# Patient Record
Sex: FEMALE | Race: WHITE | NOT HISPANIC OR LATINO
[De-identification: names, ages, dates, MRNs, and addresses within clinical notes are randomized per-mention and may not be internally consistent; named-entity substitution may affect disease eponyms.]

---

## 2019-04-09 ENCOUNTER — RESULT REVIEW (OUTPATIENT)
Age: 34
End: 2019-04-09

## 2019-05-07 ENCOUNTER — APPOINTMENT (OUTPATIENT)
Dept: ANTEPARTUM | Facility: CLINIC | Age: 34
End: 2019-05-07
Payer: COMMERCIAL

## 2019-05-07 ENCOUNTER — ASOB RESULT (OUTPATIENT)
Age: 34
End: 2019-05-07

## 2019-05-07 PROCEDURE — 36415 COLL VENOUS BLD VENIPUNCTURE: CPT

## 2019-05-07 PROCEDURE — 99201 OFFICE OUTPATIENT NEW 10 MINUTES: CPT | Mod: 25

## 2019-05-07 PROCEDURE — 36416 COLLJ CAPILLARY BLOOD SPEC: CPT

## 2019-05-07 PROCEDURE — 76813 OB US NUCHAL MEAS 1 GEST: CPT

## 2019-05-07 PROCEDURE — 76801 OB US < 14 WKS SINGLE FETUS: CPT

## 2019-05-21 ENCOUNTER — TRANSCRIPTION ENCOUNTER (OUTPATIENT)
Age: 34
End: 2019-05-21

## 2019-07-05 ENCOUNTER — ASOB RESULT (OUTPATIENT)
Age: 34
End: 2019-07-05

## 2019-07-05 ENCOUNTER — APPOINTMENT (OUTPATIENT)
Dept: ANTEPARTUM | Facility: CLINIC | Age: 34
End: 2019-07-05
Payer: COMMERCIAL

## 2019-07-05 PROCEDURE — 76811 OB US DETAILED SNGL FETUS: CPT

## 2019-07-05 PROCEDURE — 99213 OFFICE O/P EST LOW 20 MIN: CPT | Mod: 25

## 2019-11-05 ENCOUNTER — TRANSCRIPTION ENCOUNTER (OUTPATIENT)
Age: 34
End: 2019-11-05

## 2019-11-05 ENCOUNTER — OUTPATIENT (OUTPATIENT)
Dept: OUTPATIENT SERVICES | Facility: HOSPITAL | Age: 34
LOS: 1 days | End: 2019-11-05

## 2019-11-05 ENCOUNTER — INPATIENT (INPATIENT)
Facility: HOSPITAL | Age: 34
LOS: 3 days | Discharge: HOME | End: 2019-11-09
Attending: OBSTETRICS & GYNECOLOGY | Admitting: OBSTETRICS & GYNECOLOGY

## 2019-11-05 VITALS
WEIGHT: 227.96 LBS | TEMPERATURE: 98 F | DIASTOLIC BLOOD PRESSURE: 74 MMHG | HEIGHT: 63 IN | OXYGEN SATURATION: 98 % | HEART RATE: 85 BPM | RESPIRATION RATE: 16 BRPM | SYSTOLIC BLOOD PRESSURE: 106 MMHG

## 2019-11-05 VITALS
HEART RATE: 93 BPM | TEMPERATURE: 98 F | RESPIRATION RATE: 16 BRPM | SYSTOLIC BLOOD PRESSURE: 123 MMHG | DIASTOLIC BLOOD PRESSURE: 70 MMHG

## 2019-11-05 DIAGNOSIS — Z98.890 OTHER SPECIFIED POSTPROCEDURAL STATES: Chronic | ICD-10-CM

## 2019-11-05 DIAGNOSIS — O42.90 PREMATURE RUPTURE OF MEMBRANES, UNSPECIFIED AS TO LENGTH OF TIME BETWEEN RUPTURE AND ONSET OF LABOR, UNSPECIFIED WEEKS OF GESTATION: ICD-10-CM

## 2019-11-05 DIAGNOSIS — K08.409 PARTIAL LOSS OF TEETH, UNSPECIFIED CAUSE, UNSPECIFIED CLASS: Chronic | ICD-10-CM

## 2019-11-05 DIAGNOSIS — Z87.2 PERSONAL HISTORY OF DISEASES OF THE SKIN AND SUBCUTANEOUS TISSUE: Chronic | ICD-10-CM

## 2019-11-05 DIAGNOSIS — T78.40XA ALLERGY, UNSPECIFIED, INITIAL ENCOUNTER: ICD-10-CM

## 2019-11-05 DIAGNOSIS — Z3A.00 WEEKS OF GESTATION OF PREGNANCY NOT SPECIFIED: ICD-10-CM

## 2019-11-05 DIAGNOSIS — Z01.818 ENCOUNTER FOR OTHER PREPROCEDURAL EXAMINATION: ICD-10-CM

## 2019-11-05 DIAGNOSIS — O26.899 OTHER SPECIFIED PREGNANCY RELATED CONDITIONS, UNSPECIFIED TRIMESTER: ICD-10-CM

## 2019-11-05 LAB
ANION GAP SERPL CALC-SCNC: 16 MMO/L — HIGH (ref 7–14)
APPEARANCE UR: CLEAR — SIGNIFICANT CHANGE UP
BILIRUB UR-MCNC: NEGATIVE — SIGNIFICANT CHANGE UP
BLOOD UR QL VISUAL: NEGATIVE — SIGNIFICANT CHANGE UP
BUN SERPL-MCNC: 12 MG/DL — SIGNIFICANT CHANGE UP (ref 7–23)
CALCIUM SERPL-MCNC: 9.5 MG/DL — SIGNIFICANT CHANGE UP (ref 8.4–10.5)
CHLORIDE SERPL-SCNC: 100 MMOL/L — SIGNIFICANT CHANGE UP (ref 98–107)
CO2 SERPL-SCNC: 19 MMOL/L — LOW (ref 22–31)
COLOR SPEC: SIGNIFICANT CHANGE UP
CREAT SERPL-MCNC: 0.6 MG/DL — SIGNIFICANT CHANGE UP (ref 0.5–1.3)
GLUCOSE SERPL-MCNC: 107 MG/DL — HIGH (ref 70–99)
GLUCOSE UR-MCNC: NEGATIVE — SIGNIFICANT CHANGE UP
HCT VFR BLD CALC: 36.4 % — SIGNIFICANT CHANGE UP (ref 34.5–45)
HGB BLD-MCNC: 11.7 G/DL — SIGNIFICANT CHANGE UP (ref 11.5–15.5)
KETONES UR-MCNC: NEGATIVE — SIGNIFICANT CHANGE UP
LEUKOCYTE ESTERASE UR-ACNC: NEGATIVE — SIGNIFICANT CHANGE UP
MCHC RBC-ENTMCNC: 28.3 PG — SIGNIFICANT CHANGE UP (ref 27–34)
MCHC RBC-ENTMCNC: 32.1 % — SIGNIFICANT CHANGE UP (ref 32–36)
MCV RBC AUTO: 88.1 FL — SIGNIFICANT CHANGE UP (ref 80–100)
NITRITE UR-MCNC: NEGATIVE — SIGNIFICANT CHANGE UP
NRBC # FLD: 0 K/UL — SIGNIFICANT CHANGE UP (ref 0–0)
PH UR: 6.5 — SIGNIFICANT CHANGE UP (ref 5–8)
PLATELET # BLD AUTO: 274 K/UL — SIGNIFICANT CHANGE UP (ref 150–400)
PMV BLD: 11.2 FL — SIGNIFICANT CHANGE UP (ref 7–13)
POTASSIUM SERPL-MCNC: 3.9 MMOL/L — SIGNIFICANT CHANGE UP (ref 3.5–5.3)
POTASSIUM SERPL-SCNC: 3.9 MMOL/L — SIGNIFICANT CHANGE UP (ref 3.5–5.3)
PROT UR-MCNC: NEGATIVE — SIGNIFICANT CHANGE UP
RBC # BLD: 4.13 M/UL — SIGNIFICANT CHANGE UP (ref 3.8–5.2)
RBC # FLD: 14.1 % — SIGNIFICANT CHANGE UP (ref 10.3–14.5)
SODIUM SERPL-SCNC: 135 MMOL/L — SIGNIFICANT CHANGE UP (ref 135–145)
SP GR SPEC: 1.01 — SIGNIFICANT CHANGE UP (ref 1–1.04)
UROBILINOGEN FLD QL: NORMAL — SIGNIFICANT CHANGE UP
WBC # BLD: 10.26 K/UL — SIGNIFICANT CHANGE UP (ref 3.8–10.5)
WBC # FLD AUTO: 10.26 K/UL — SIGNIFICANT CHANGE UP (ref 3.8–10.5)

## 2019-11-05 RX ORDER — OXYTOCIN 10 UNIT/ML
333.33 VIAL (ML) INJECTION
Qty: 20 | Refills: 0 | Status: DISCONTINUED | OUTPATIENT
Start: 2019-11-05 | End: 2019-11-06

## 2019-11-05 RX ORDER — METOCLOPRAMIDE HCL 10 MG
10 TABLET ORAL ONCE
Refills: 0 | Status: DISCONTINUED | OUTPATIENT
Start: 2019-11-05 | End: 2019-11-06

## 2019-11-05 RX ORDER — SODIUM CHLORIDE 9 MG/ML
1000 INJECTION, SOLUTION INTRAVENOUS ONCE
Refills: 0 | Status: DISCONTINUED | OUTPATIENT
Start: 2019-11-05 | End: 2019-11-06

## 2019-11-05 RX ORDER — FAMOTIDINE 10 MG/ML
20 INJECTION INTRAVENOUS ONCE
Refills: 0 | Status: DISCONTINUED | OUTPATIENT
Start: 2019-11-05 | End: 2019-11-06

## 2019-11-05 RX ORDER — SODIUM CHLORIDE 9 MG/ML
1000 INJECTION, SOLUTION INTRAVENOUS
Refills: 0 | Status: DISCONTINUED | OUTPATIENT
Start: 2019-11-05 | End: 2019-11-06

## 2019-11-05 RX ORDER — CITRIC ACID/SODIUM CITRATE 300-500 MG
30 SOLUTION, ORAL ORAL ONCE
Refills: 0 | Status: DISCONTINUED | OUTPATIENT
Start: 2019-11-05 | End: 2019-11-06

## 2019-11-05 NOTE — OB PROVIDER TRIAGE NOTE - HISTORY OF PRESENT ILLNESS
34y.o. G1 at 38.3weeks presenting with complaints of leakage of fluid today at 2040.  Patient reports experiencing a gush of fluid and has been leaking since, fluid is clear in nature.  Patient reports positive fetal movement, denies vaginal bleeding, denies contractions.    a/p course complicated by breech presentation  patient scheduled for primary c/s at 39w on 11/11/2019

## 2019-11-05 NOTE — OB RN TRIAGE NOTE - CHIEF COMPLAINT QUOTE
I broke my water @ 2040 , clear . I'm scheduled for C/S on 11/11 for breeckh presentation. I broke my water @ 2040 , clear . I'm scheduled for C/S on 11/11 for breech presentation.

## 2019-11-05 NOTE — OB PST NOTE - NSHPPHYSICALEXAM_GEN_ALL_CORE
Constitutional: Well Developed, Well Groomed, Well Nourished, No Distress    Eyes: PERRL, EOMI, conjunctiva clear    Ears: Normal    Mouth & Gums: Normal, moist    Pharynx: No tenderness, discharge, or peritonsillar abscess    Tonsils: No Redness, discharge, tenderness, or swelling    Neck: Supple, no JVD, normal thyroid glands, no carotid bruits, no cervical vertebral or paraspinal tenderness      Back: Normal shape, ROM intact, strength intact, no vertebral tenderness    Respiratory: Airway patent, breath sounds equal, good air movement, respiration non-labored, clear to auscultation bilateral, no chest wall tenderness, no intercostal retractions, no rales, no wheezes, no rhonchi, no subcutaneous emphysema    Cardiovascular:  Regular rate and rhythm, no rubs or murmur, normal PMI    Gastrointestinal: Gravid Uterus ; pt reports positive fetal mobility    Extremities: No clubbing, cyanosis, or pedal edema    Vascular:  Carotid Pulse normal , Radial Pulse normal, Femoral Pulse normal, DP pulse normal, PT pulse normal    Neurological: alert & oriented x 3, sensation intact, deep reflexes intact, cranial nerve intact, normal strength    Skin: warm and dry, normal color    Lymph Nodes: normal posterior cervical lymph node, normal anterior cervical lymph node, normal supraclavicular lymph node, normal axillary lymph node, normal inguinal lymph node, normal femoral lymph node    Musculoskeletal: ROM intact, no joint swelling, warmth, or calf tenderness. Normal strength    Psychiatric: normal affect, normal behavior

## 2019-11-05 NOTE — OB PROVIDER TRIAGE NOTE - NSHPPHYSICALEXAM_GEN_ALL_CORE
Vital Signs Last 24 Hrs  T(C): 36.6 (05 Nov 2019 22:15), Max: 36.8 (05 Nov 2019 21:50)  T(F): 97.88 (05 Nov 2019 22:15), Max: 98.2 (05 Nov 2019 21:50)  HR: 90 (05 Nov 2019 22:26) (85 - 93)  BP: 119/69 (05 Nov 2019 22:26) (106/74 - 123/70)  BP(mean): --  RR: 16 (05 Nov 2019 21:50) (16 - 16)  SpO2: 98% (05 Nov 2019 17:55) (98% - 98%)    abdomen soft nontender gravid  fhr 145  no uterine contractions noted on tocometer    SSE: no lesions   (+) pooling, (+) nitrazine, (+) ferning  VE: closed    TAVUS: breech; head to maternal right

## 2019-11-05 NOTE — OB PST NOTE - PMH
Hypoglycemia    Hypothyroidism  dx @ 25 y.o. F/U with endocrinologist DR Renate Vanessa ; last labs 2 weeks ago

## 2019-11-05 NOTE — OB PST NOTE - PROBLEM SELECTOR PLAN 1
Primary  Breech Presentation Primary  Section  Breech Presentation    Pre op instructions including Hibiclens with teach back reviewed with pt ; pt verbalized good understanding of pre op instructions

## 2019-11-05 NOTE — OB PROVIDER H&P - ASSESSMENT
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     Allergies   Avelox  Ceclor  Duricef    Medications  Synthroid 88mcg  PNV    Assessment  Vital Signs Last 24 Hrs  T(C): 36.6 (05 Nov 2019 22:15), Max: 36.8 (05 Nov 2019 21:50)  T(F): 97.88 (05 Nov 2019 22:15), Max: 98.2 (05 Nov 2019 21:50)  HR: 90 (05 Nov 2019 22:26) (85 - 93)  BP: 119/69 (05 Nov 2019 22:26) (106/74 - 123/70)  BP(mean): --  RR: 16 (05 Nov 2019 21:50) (16 - 16)  SpO2: 98% (05 Nov 2019 17:55) (98% - 98%)    abdomen soft nontender gravid  fhr 145  no uterine contractions noted on tocometer    SSE: no lesions   (+) pooling, (+) nitrazine, (+) ferning  VE: closed    TAVUS: breech; head to maternal right    Last ate pizza at 2030    Admit to Labor and Delivery for primary c/s ROM not in labor   Routine admission labs   Pre-Operative medications to be administered   [facility] EFM and [place]     D/w Dr. Hull & Dr. 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     Allergies   Avelox:   Ceclor: anaphylaxis   Duricef: anaphylaxis      Medications  Synthroid 88mcg  PNV    Assessment  Vital Signs Last 24 Hrs  T(C): 36.6 (05 Nov 2019 22:15), Max: 36.8 (05 Nov 2019 21:50)  T(F): 97.88 (05 Nov 2019 22:15), Max: 98.2 (05 Nov 2019 21:50)  HR: 90 (05 Nov 2019 22:26) (85 - 93)  BP: 119/69 (05 Nov 2019 22:26) (106/74 - 123/70)  BP(mean): --  RR: 16 (05 Nov 2019 21:50) (16 - 16)  SpO2: 98% (05 Nov 2019 17:55) (98% - 98%)    abdomen soft nontender gravid  fhr 145  no uterine contractions noted on tocometer    SSE: no lesions   (+) pooling, (+) nitrazine, (+) ferning  VE: closed    TAVUS: breech; head to maternal right    Last ate pizza at 2030    Admit to Labor and Delivery for primary c/s ROM not in labor   Routine admission labs   Pre-Operative medications to be administered   Contin[facility] EFM and [place]     D/w Dr. Hull & Dr. Juarez

## 2019-11-05 NOTE — OB PST NOTE - HISTORY OF PRESENT ILLNESS
Pt is a 34 y.o. female Information obtained from pt and  Worksheet provided by surgeon office . Pt reports LMP 19 38 week 3 day pregnancy EDC 19. Pt reports 19 ; a sonogram was done " the baby is breech " Pt states last sonogram 10/30/19. pt now presents for Primary  Section ; Breech Presentation    Pt reports 48 lb weight gain with pregnancy   Pt reports positive fetal mobility

## 2019-11-05 NOTE — OB PROVIDER H&P - HISTORY OF PRESENT ILLNESS
34y.o. G1 at 38.3weeks presenting with complaints of leakage of fluid today at 2040.  Patient reports experiencing a gush of fluid and has been leaking since, fluid is clear in nature.  Patient reports positive fetal movement, denies vaginal bleeding, denies contractions.    a/p course complicated by breech presentation  patient scheduled for primary c/s at 39w on 11/11/2019  GBS(-)

## 2019-11-05 NOTE — OB PST NOTE - NSHPREVIEWOFSYSTEMS_GEN_ALL_CORE
General: No fever, chills, sweating, anorexia, +48 lb weight gain, or dryness.     Breast: No tenderness, lumps, or nipple discharge      Ophthalmologic:+ dry eyes;  No diplopia, photophobia, lacrimation, blurred Vision , or eye discharge    ENMT Symptoms: No hearing difficulty, ear pain, tinnitus, or vertigo. No sinus symptoms, nasal congestion, nasal   discharge, or nasal obstruction    Respiratory and Thorax: No wheezing, dyspnea, cough, hemoptysis, or pleuritic chest pPain     Cardiovascular: No chest pain, palpitations, dyspnea on exertion, orthopnea, paroxysmal nocturnal dyspnea,   peripheral edema, or claudication    Gastrointestinal: No nausea, vomiting, diarrhea, constipation,    Genitourinary/ Pelvis: No hematuria, renal colic, or flank pain.  No urine discoloration, incontinence, dysuria, or urinary hesitancy. Normal urinary frequency. No nocturia, abnormal vaginal bleeding, vaginal discharge, spotting, pelvic pain, or vaginal leakage    Musculoskeletal: + lower back pain throughout pregnancy pt  denies recent studies  No arthralgia, arthritis, joint swelling, muscle cramping, muscle weakness, neck pain, arm pain, or leg pain    Neurological: No transient paralysis, weakness, paresthesias, or seizures. No syncope, tremors, vertigo, loss of sensation, difficulty walking, loss of consciousness, hemiparesis, confusion, or facial palsy    Psychiatric: No suicidal ideation, depression, anxiety, insomnia     Hematology: No gum bleeding, nose bleeding, or skin lumps    Lymphatic: No enlarged or tender lymph nodes. No extremity swelling    Endocrine: No heat or cold intolerance; pt reports h/o hypoglycemia     Immunologic: No recurrent or persistent infections General: No fever, chills, sweating, anorexia, +48 lb weight gain, or dryness.     Breast: No tenderness, lumps, or nipple discharge      Ophthalmologic:+ dry eyes;  No diplopia, photophobia, lacrimation, blurred Vision , or eye discharge    ENMT Symptoms: No hearing difficulty, ear pain, tinnitus, or vertigo. No sinus symptoms, nasal congestion, nasal   discharge, or nasal obstruction    Respiratory and Thorax: No wheezing, dyspnea, cough, hemoptysis, or pleuritic chest pain     Cardiovascular: No chest pain, palpitations, dyspnea on exertion, orthopnea, paroxysmal nocturnal dyspnea,   peripheral edema, or claudication    Gastrointestinal: No nausea, vomiting, diarrhea, constipation,    Genitourinary/ Pelvis: No hematuria, renal colic, or flank pain.  No urine discoloration, incontinence, dysuria, or urinary hesitancy. Normal urinary frequency. No nocturia, abnormal vaginal bleeding, vaginal discharge, spotting, pelvic pain, or vaginal leakage    Musculoskeletal: + lower back pain throughout pregnancy pt  denies recent studies  No arthralgia, arthritis, joint swelling, muscle cramping, muscle weakness, neck pain, arm pain, or leg pain    Neurological: No transient paralysis, weakness, paresthesias, or seizures. No syncope, tremors, vertigo, loss of sensation, difficulty walking, loss of consciousness, hemiparesis, confusion, or facial palsy    Psychiatric: No suicidal ideation, depression, anxiety, insomnia     Hematology: No gum bleeding, nose bleeding, or skin lumps    Lymphatic: No enlarged or tender lymph nodes. No extremity swelling    Endocrine: No heat or cold intolerance; pt reports h/o hypoglycemia     Immunologic: No recurrent or persistent infections

## 2019-11-06 ENCOUNTER — TRANSCRIPTION ENCOUNTER (OUTPATIENT)
Age: 34
End: 2019-11-06

## 2019-11-06 LAB
BASOPHILS # BLD AUTO: 0.03 K/UL — SIGNIFICANT CHANGE UP (ref 0–0.2)
BASOPHILS NFR BLD AUTO: 0.3 % — SIGNIFICANT CHANGE UP (ref 0–2)
BLD GP AB SCN SERPL QL: NEGATIVE — SIGNIFICANT CHANGE UP
EOSINOPHIL # BLD AUTO: 0.07 K/UL — SIGNIFICANT CHANGE UP (ref 0–0.5)
EOSINOPHIL NFR BLD AUTO: 0.6 % — SIGNIFICANT CHANGE UP (ref 0–6)
HCT VFR BLD CALC: 34.6 % — SIGNIFICANT CHANGE UP (ref 34.5–45)
HCT VFR BLD CALC: 37.5 % — SIGNIFICANT CHANGE UP (ref 34.5–45)
HGB BLD-MCNC: 11.2 G/DL — LOW (ref 11.5–15.5)
HGB BLD-MCNC: 11.7 G/DL — SIGNIFICANT CHANGE UP (ref 11.5–15.5)
IMM GRANULOCYTES NFR BLD AUTO: 0.6 % — SIGNIFICANT CHANGE UP (ref 0–1.5)
LYMPHOCYTES # BLD AUTO: 2.28 K/UL — SIGNIFICANT CHANGE UP (ref 1–3.3)
LYMPHOCYTES # BLD AUTO: 20.2 % — SIGNIFICANT CHANGE UP (ref 13–44)
MCHC RBC-ENTMCNC: 28.3 PG — SIGNIFICANT CHANGE UP (ref 27–34)
MCHC RBC-ENTMCNC: 28.7 PG — SIGNIFICANT CHANGE UP (ref 27–34)
MCHC RBC-ENTMCNC: 31.2 % — LOW (ref 32–36)
MCHC RBC-ENTMCNC: 32.4 % — SIGNIFICANT CHANGE UP (ref 32–36)
MCV RBC AUTO: 88.7 FL — SIGNIFICANT CHANGE UP (ref 80–100)
MCV RBC AUTO: 90.6 FL — SIGNIFICANT CHANGE UP (ref 80–100)
MONOCYTES # BLD AUTO: 0.73 K/UL — SIGNIFICANT CHANGE UP (ref 0–0.9)
MONOCYTES NFR BLD AUTO: 6.5 % — SIGNIFICANT CHANGE UP (ref 2–14)
NEUTROPHILS # BLD AUTO: 8.1 K/UL — HIGH (ref 1.8–7.4)
NEUTROPHILS NFR BLD AUTO: 71.8 % — SIGNIFICANT CHANGE UP (ref 43–77)
NRBC # FLD: 0 K/UL — SIGNIFICANT CHANGE UP (ref 0–0)
NRBC # FLD: 0 K/UL — SIGNIFICANT CHANGE UP (ref 0–0)
PLATELET # BLD AUTO: 219 K/UL — SIGNIFICANT CHANGE UP (ref 150–400)
PLATELET # BLD AUTO: 250 K/UL — SIGNIFICANT CHANGE UP (ref 150–400)
PMV BLD: 10.9 FL — SIGNIFICANT CHANGE UP (ref 7–13)
PMV BLD: 11.4 FL — SIGNIFICANT CHANGE UP (ref 7–13)
RBC # BLD: 3.9 M/UL — SIGNIFICANT CHANGE UP (ref 3.8–5.2)
RBC # BLD: 4.14 M/UL — SIGNIFICANT CHANGE UP (ref 3.8–5.2)
RBC # FLD: 14.1 % — SIGNIFICANT CHANGE UP (ref 10.3–14.5)
RBC # FLD: 14.1 % — SIGNIFICANT CHANGE UP (ref 10.3–14.5)
RH IG SCN BLD-IMP: POSITIVE — SIGNIFICANT CHANGE UP
T PALLIDUM AB TITR SER: NEGATIVE — SIGNIFICANT CHANGE UP
T PALLIDUM AB TITR SER: NEGATIVE — SIGNIFICANT CHANGE UP
WBC # BLD: 11.08 K/UL — HIGH (ref 3.8–10.5)
WBC # BLD: 11.28 K/UL — HIGH (ref 3.8–10.5)
WBC # FLD AUTO: 11.08 K/UL — HIGH (ref 3.8–10.5)
WBC # FLD AUTO: 11.28 K/UL — HIGH (ref 3.8–10.5)

## 2019-11-06 RX ORDER — SODIUM CHLORIDE 9 MG/ML
500 INJECTION, SOLUTION INTRAVENOUS ONCE
Refills: 0 | Status: COMPLETED | OUTPATIENT
Start: 2019-11-06 | End: 2019-11-06

## 2019-11-06 RX ORDER — TETANUS TOXOID, REDUCED DIPHTHERIA TOXOID AND ACELLULAR PERTUSSIS VACCINE, ADSORBED 5; 2.5; 8; 8; 2.5 [IU]/.5ML; [IU]/.5ML; UG/.5ML; UG/.5ML; UG/.5ML
0.5 SUSPENSION INTRAMUSCULAR ONCE
Refills: 0 | Status: DISCONTINUED | OUTPATIENT
Start: 2019-11-06 | End: 2019-11-09

## 2019-11-06 RX ORDER — ACETAMINOPHEN 500 MG
975 TABLET ORAL
Refills: 0 | Status: DISCONTINUED | OUTPATIENT
Start: 2019-11-06 | End: 2019-11-09

## 2019-11-06 RX ORDER — OXYCODONE HYDROCHLORIDE 5 MG/1
10 TABLET ORAL
Refills: 0 | Status: DISCONTINUED | OUTPATIENT
Start: 2019-11-06 | End: 2019-11-07

## 2019-11-06 RX ORDER — SODIUM CHLORIDE 9 MG/ML
1000 INJECTION, SOLUTION INTRAVENOUS
Refills: 0 | Status: DISCONTINUED | OUTPATIENT
Start: 2019-11-06 | End: 2019-11-06

## 2019-11-06 RX ORDER — HYDROMORPHONE HYDROCHLORIDE 2 MG/ML
1 INJECTION INTRAMUSCULAR; INTRAVENOUS; SUBCUTANEOUS
Refills: 0 | Status: DISCONTINUED | OUTPATIENT
Start: 2019-11-06 | End: 2019-11-07

## 2019-11-06 RX ORDER — SODIUM CHLORIDE 9 MG/ML
1000 INJECTION, SOLUTION INTRAVENOUS ONCE
Refills: 0 | Status: COMPLETED | OUTPATIENT
Start: 2019-11-06 | End: 2019-11-06

## 2019-11-06 RX ORDER — IBUPROFEN 200 MG
600 TABLET ORAL EVERY 6 HOURS
Refills: 0 | Status: COMPLETED | OUTPATIENT
Start: 2019-11-06 | End: 2020-10-04

## 2019-11-06 RX ORDER — CITRIC ACID/SODIUM CITRATE 300-500 MG
30 SOLUTION, ORAL ORAL ONCE
Refills: 0 | Status: COMPLETED | OUTPATIENT
Start: 2019-11-06 | End: 2019-11-06

## 2019-11-06 RX ORDER — OXYCODONE HYDROCHLORIDE 5 MG/1
5 TABLET ORAL
Refills: 0 | Status: DISCONTINUED | OUTPATIENT
Start: 2019-11-06 | End: 2019-11-07

## 2019-11-06 RX ORDER — NALOXONE HYDROCHLORIDE 4 MG/.1ML
0.1 SPRAY NASAL
Refills: 0 | Status: DISCONTINUED | OUTPATIENT
Start: 2019-11-06 | End: 2019-11-07

## 2019-11-06 RX ORDER — LANOLIN
1 OINTMENT (GRAM) TOPICAL EVERY 6 HOURS
Refills: 0 | Status: DISCONTINUED | OUTPATIENT
Start: 2019-11-06 | End: 2019-11-09

## 2019-11-06 RX ORDER — MORPHINE SULFATE 50 MG/1
0.15 CAPSULE, EXTENDED RELEASE ORAL ONCE
Refills: 0 | Status: DISCONTINUED | OUTPATIENT
Start: 2019-11-06 | End: 2019-11-06

## 2019-11-06 RX ORDER — FERROUS SULFATE 325(65) MG
325 TABLET ORAL DAILY
Refills: 0 | Status: DISCONTINUED | OUTPATIENT
Start: 2019-11-06 | End: 2019-11-09

## 2019-11-06 RX ORDER — KETOROLAC TROMETHAMINE 30 MG/ML
30 SYRINGE (ML) INJECTION EVERY 6 HOURS
Refills: 0 | Status: DISCONTINUED | OUTPATIENT
Start: 2019-11-06 | End: 2019-11-07

## 2019-11-06 RX ORDER — IBUPROFEN 200 MG
1 TABLET ORAL
Qty: 0 | Refills: 0 | DISCHARGE
Start: 2019-11-06

## 2019-11-06 RX ORDER — OXYTOCIN 10 UNIT/ML
333.33 VIAL (ML) INJECTION
Qty: 20 | Refills: 0 | Status: DISCONTINUED | OUTPATIENT
Start: 2019-11-06 | End: 2019-11-06

## 2019-11-06 RX ORDER — MAGNESIUM HYDROXIDE 400 MG/1
30 TABLET, CHEWABLE ORAL
Refills: 0 | Status: DISCONTINUED | OUTPATIENT
Start: 2019-11-06 | End: 2019-11-09

## 2019-11-06 RX ORDER — HEPARIN SODIUM 5000 [USP'U]/ML
5000 INJECTION INTRAVENOUS; SUBCUTANEOUS EVERY 12 HOURS
Refills: 0 | Status: DISCONTINUED | OUTPATIENT
Start: 2019-11-06 | End: 2019-11-09

## 2019-11-06 RX ORDER — ONDANSETRON 8 MG/1
4 TABLET, FILM COATED ORAL EVERY 6 HOURS
Refills: 0 | Status: DISCONTINUED | OUTPATIENT
Start: 2019-11-06 | End: 2019-11-07

## 2019-11-06 RX ORDER — GENTAMICIN SULFATE 40 MG/ML
373 VIAL (ML) INJECTION ONCE
Refills: 0 | Status: DISCONTINUED | OUTPATIENT
Start: 2019-11-06 | End: 2019-11-06

## 2019-11-06 RX ORDER — GLYCERIN ADULT
1 SUPPOSITORY, RECTAL RECTAL AT BEDTIME
Refills: 0 | Status: DISCONTINUED | OUTPATIENT
Start: 2019-11-06 | End: 2019-11-09

## 2019-11-06 RX ORDER — LEVOTHYROXINE SODIUM 125 MCG
88 TABLET ORAL DAILY
Refills: 0 | Status: DISCONTINUED | OUTPATIENT
Start: 2019-11-06 | End: 2019-11-09

## 2019-11-06 RX ORDER — OXYCODONE HYDROCHLORIDE 5 MG/1
5 TABLET ORAL
Refills: 0 | Status: DISCONTINUED | OUTPATIENT
Start: 2019-11-06 | End: 2019-11-09

## 2019-11-06 RX ORDER — SIMETHICONE 80 MG/1
80 TABLET, CHEWABLE ORAL EVERY 4 HOURS
Refills: 0 | Status: DISCONTINUED | OUTPATIENT
Start: 2019-11-06 | End: 2019-11-09

## 2019-11-06 RX ORDER — SENNA PLUS 8.6 MG/1
2 TABLET ORAL AT BEDTIME
Refills: 0 | Status: DISCONTINUED | OUTPATIENT
Start: 2019-11-06 | End: 2019-11-09

## 2019-11-06 RX ORDER — ACETAMINOPHEN 500 MG
3 TABLET ORAL
Qty: 0 | Refills: 0 | DISCHARGE
Start: 2019-11-06

## 2019-11-06 RX ORDER — FAMOTIDINE 10 MG/ML
20 INJECTION INTRAVENOUS ONCE
Refills: 0 | Status: COMPLETED | OUTPATIENT
Start: 2019-11-06 | End: 2019-11-06

## 2019-11-06 RX ORDER — OXYCODONE HYDROCHLORIDE 5 MG/1
5 TABLET ORAL ONCE
Refills: 0 | Status: DISCONTINUED | OUTPATIENT
Start: 2019-11-06 | End: 2019-11-09

## 2019-11-06 RX ORDER — METOCLOPRAMIDE HCL 10 MG
10 TABLET ORAL ONCE
Refills: 0 | Status: COMPLETED | OUTPATIENT
Start: 2019-11-06 | End: 2019-11-06

## 2019-11-06 RX ORDER — DIPHENHYDRAMINE HCL 50 MG
25 CAPSULE ORAL EVERY 6 HOURS
Refills: 0 | Status: DISCONTINUED | OUTPATIENT
Start: 2019-11-06 | End: 2019-11-09

## 2019-11-06 RX ADMIN — Medication 975 MILLIGRAM(S): at 21:49

## 2019-11-06 RX ADMIN — SODIUM CHLORIDE 125 MILLILITER(S): 9 INJECTION, SOLUTION INTRAVENOUS at 01:16

## 2019-11-06 RX ADMIN — Medication 30 MILLIGRAM(S): at 12:39

## 2019-11-06 RX ADMIN — Medication 10 MILLIGRAM(S): at 04:32

## 2019-11-06 RX ADMIN — Medication 500 MILLIGRAM(S): at 05:00

## 2019-11-06 RX ADMIN — Medication 30 MILLILITER(S): at 04:32

## 2019-11-06 RX ADMIN — Medication 1000 MILLIUNIT(S)/MIN: at 06:48

## 2019-11-06 RX ADMIN — SODIUM CHLORIDE 1000 MILLILITER(S): 9 INJECTION, SOLUTION INTRAVENOUS at 11:15

## 2019-11-06 RX ADMIN — ONDANSETRON 4 MILLIGRAM(S): 8 TABLET, FILM COATED ORAL at 07:35

## 2019-11-06 RX ADMIN — SODIUM CHLORIDE 2000 MILLILITER(S): 9 INJECTION, SOLUTION INTRAVENOUS at 04:32

## 2019-11-06 RX ADMIN — Medication 975 MILLIGRAM(S): at 22:20

## 2019-11-06 RX ADMIN — SODIUM CHLORIDE 1000 MILLILITER(S): 9 INJECTION, SOLUTION INTRAVENOUS at 08:15

## 2019-11-06 RX ADMIN — FAMOTIDINE 20 MILLIGRAM(S): 10 INJECTION INTRAVENOUS at 04:32

## 2019-11-06 RX ADMIN — SODIUM CHLORIDE 2000 MILLILITER(S): 9 INJECTION, SOLUTION INTRAVENOUS at 06:35

## 2019-11-06 RX ADMIN — SODIUM CHLORIDE 75 MILLILITER(S): 9 INJECTION, SOLUTION INTRAVENOUS at 06:30

## 2019-11-06 RX ADMIN — HEPARIN SODIUM 5000 UNIT(S): 5000 INJECTION INTRAVENOUS; SUBCUTANEOUS at 12:38

## 2019-11-06 NOTE — BRIEF OPERATIVE NOTE - NSICDXBRIEFPREOP_GEN_ALL_CORE_FT
PRE-OP DIAGNOSIS:  Rupture of membranes with delay of delivery 06-Nov-2019 06:42:57 Rupture of membranes with delay of delivery Marsha Martínez  Breech presentation 06-Nov-2019 06:43:16  Marsha Martínez

## 2019-11-06 NOTE — LACTATION INITIAL EVALUATION - LACTATION INTERVENTIONS
assisted to put baby to rt. breast in football hold position with deep latch and baby noted to suck with stimulation;  pt. taught to hand express colostrum and feed baby from spoon/initiate skin to skin

## 2019-11-06 NOTE — PROVIDER CONTACT NOTE (OTHER) - ASSESSMENT
Laying HR 78   Laying BP  88/46  Sitting HR 72   Sitting /55   Standing HR 88  Standing /61. Pt asymptomatic. Pt states "I need to pee." Pt voided 800 @ 2105
Temp 36.4, HR 82, 101/54, 20 resp, 97% RA, Bleeding WNL, lungs clear, Pt denies pain.

## 2019-11-06 NOTE — OB RN PATIENT PROFILE - NS_PRENATALLOC_OBGYN_ALL_OB
Unsure of the etiology   Last WBC done [facility] on 10/24/17 was 6.2   Will f/up blood culture X 2 : Pend   RVP stat   No Respiratory complaints   WIll get U/A and UCX   F/up RVP
MD Office

## 2019-11-06 NOTE — OB PROVIDER DELIVERY SUMMARY - NSPROVIDERDELIVERYNOTE_OBGYN_ALL_OB_FT
Liveborn male infant from breech position   Apgars 9,9  Grossly normal uterus, tubes, ovaries bl  Uterus closed in one layer with vicryl  Peritoneum closed with vicryl  Subq with plan gut in running fashion  skin subcuticular with monocryl  good hemostasis and count correctx3

## 2019-11-06 NOTE — OB POSTPARTUM EVENT NOTE - NS_EVENTPTSUMMARY1_OBGYN_ALL_OB_FT
At 0630, patient vital signs are as follows:  BP 74/56  HR 87  RR 22  02 sat 97%    Repeat vitals at 0631 include:  BP 95/55  HR 87  RR 24  02 sat 96%

## 2019-11-06 NOTE — OB PROVIDER DELIVERY SUMMARY - NSPPHNORISK_OBGYN_ALL_OB
After evaluating the patient it has been determined they are at risk for postpartum hemorrhage. In my judgment no risk for PPH has been identified at this time.

## 2019-11-06 NOTE — OB POSTPARTUM EVENT NOTE - NS_EVENTSUMMARY1_OBGYN_ALL_OB_FT
status post primary  for SROM  at 204 clear breech presentation. Patient delivered at 0512 in OR 3 uncomplicated. , fluids in 2 liters and serrato out 100 cc. Patient at 0630 shows hypotension and complains of dizziness upon assessment by RN. Patient alert and oriented x4. Patient laid to 30 degree angle and encouraged to take slow deep breaths. MD Juarez made aware of patient's change in status.

## 2019-11-06 NOTE — OB NEONATOLOGY/PEDIATRICIAN DELIVERY SUMMARY - NSPEDSNEONOTESA_OBGYN_ALL_OB_FT
Baby is a 38.4 week GA M born to a 33 y/o  mother via primary C/S for breech presentation. Maternal history: hypoglycemia in teens, hypothyroid on synthroid. Pregnancy uncomplicated. Maternal blood type B+. Prenatal labs HIV neg, HBsAg neg, Rubella immune, RPR nonreactive. GBS - on 10/23. ROM 8hrs with clear fluid. Baby born vigorous and crying spontaneously. Warmed, dried, stimulated. Apgars 9 / 9. EOS 0.05. Breastfeeding, wants HepB, declines circ. PMD: Hero Weinberg

## 2019-11-06 NOTE — DISCHARGE NOTE OB - CARE PLAN
Goal:	delivery  Assessment and plan of treatment:	f/u 2wk Principal Discharge DX:	 delivery delivered  Goal:	delivery  Assessment and plan of treatment:	f/u 2wk

## 2019-11-06 NOTE — OB RN PATIENT PROFILE - AS SC BRADEN MOBILITY
Date: 10/24/2019    Patient Name: Renata Burt          To Whom it may concern: This letter has been written at the patient's request. The above patient was seen at the [facility] for treatment of a medical condition.     This patient sh (4) no limitation

## 2019-11-06 NOTE — DISCHARGE NOTE OB - MEDICATION SUMMARY - MEDICATIONS TO TAKE
I will START or STAY ON the medications listed below when I get home from the hospital:    acetaminophen 325 mg oral tablet  -- 3 tab(s) by mouth   -- Indication: For pain    ibuprofen 600 mg oral tablet  -- 1 tab(s) by mouth every 6 hours  -- Indication: For pain    Prenatal 1 oral capsule  -- 1 cap(s) by mouth once a day LD 11/5/19  -- Indication: For supplement    levothyroxine 88 mcg (0.088 mg) oral tablet  -- 1 tab(s) by mouth once a day  -- Indication: For thyroid supplement

## 2019-11-06 NOTE — OB POSTPARTUM EVENT NOTE - NS_EVENTFINDINGS1_OBGYN_ALL_OB_FT
MD Juarez made aware. Patient to receive a 1 liter bolus of lactated ringers over 30 minutes per MD Juarez. Will continue to monitor patient status, vital signs, and urine output per MD Juarez. patient informed of plan of care and verbalizes understanding.

## 2019-11-06 NOTE — PROVIDER CONTACT NOTE (OTHER) - SITUATION
Cesareansection patient orthostatics positive. Laying HR 78   Laying BP  88/46  Sitting HR 72   Sitting /55   Standing HR 88  Standing /61. Pt asymptomatic.

## 2019-11-06 NOTE — OB RN INTRAOPERATIVE NOTE - NS_SPECIMENS_OBGYN_ALL_OB
Anticipated Depth And Size Of Soft Tissue Excision (Required): Subcutaneous, Less than 1.5 cm Anticipated Excised Diameter (Required): 1.1 - 2.0 cm Location (Required): Extremities First Procedure You Would Like A Quote For?: Benign Excision Anticipated Depth And Size Of Soft Tissue Excision (Required): Subcutaneous, Less than 2 cm Detail Level: Detailed Repair: Intermediate Primary Closure Anticipated Stages (Required): 1 Anticipated Depth And Size Of Soft Tissue Excision (Required): Subcutaneous, Less than 3 cm Anticipated Wound Length (Required): 2.5 cm or less No

## 2019-11-06 NOTE — OB RN PATIENT PROFILE - ALERT: PERTINENT HISTORY
BioPhysical Profile(s)/Fetal Non-Stress Test (NST) 1st Trimester Sonogram/20 Week Level II Sonogram/BioPhysical Profile(s)/Fetal Non-Stress Test (NST)/Ultra Screen at 12 Weeks

## 2019-11-06 NOTE — LACTATION INITIAL EVALUATION - INTERVENTION OUTCOME
discussed benefits of breastfeeding, supply and demand, feeding on demand, feeding cues, manual expression of colostrum, wet and soiled diaper log and safe skin to skin and safe sleep practices;  encouraged to ask for assistance as needed/verbalizes understanding

## 2019-11-06 NOTE — PROVIDER CONTACT NOTE (OTHER) - RECOMMENDATIONS
Alice Lynn made aware. She recommends repeating orthostatics @ 2200
500cc Bolus and encourage PO fluids.

## 2019-11-06 NOTE — CHART NOTE - NSCHARTNOTEFT_GEN_A_CORE
PA Note    called by PACU RN 2/2 oliguria. Patient POD#0 s/p primary C/S . Patient received 2L IVF in OR. Upon arrival to PACU patient was hypotensive to the 70's & received 1L fluid bolus with improvement in vitals & urine output. Patient then received and additional 500cc bolus with UO of 40, 40, 45 following. The most recent hour however was 20ccs. Patient feels well, minimal bleeding per RN.     VS  T(C): 36.4 (11-06-19 @ 08:00)  HR: 81 (11-06-19 @ 11:00)  BP: 93/52 (11-06-19 @ 11:00)  RR: 19 (11-06-19 @ 11:00)  SpO2: 99% (11-06-19 @ 11:00)    LABS:                        11.7   11.28 )-----------( 250      ( 06 Nov 2019 00:45 )             37.5     05 Nov 2019 18:15    135    |  100    |  12     ----------------------------<  107    3.9     |  19     |  0.60     Ca    9.5        05 Nov 2019 18:15      Plan  - discussed above with Dr Escobar  - will send stat CBC   - 500cc additional fluid bolus ordered  - cont to closely monitor UO & vital signs    dw Dr Shawn aguilera PA Note    called by PACU RN 2/2 oliguria. Patient POD#0 s/p primary C/S . Patient received 2L IVF in OR. Upon arrival to PACU patient was hypotensive to the 70's & received 1L fluid bolus with improvement in vitals & urine output. Patient then received and additional 500cc bolus with UO of 40, 40, 45 following. The most recent hour however was 20ccs. Patient feels well, minimal bleeding per RN.     VS  T(C): 36.4 (11-06-19 @ 08:00)  HR: 81 (11-06-19 @ 11:00)  BP: 93/52 (11-06-19 @ 11:00)  RR: 19 (11-06-19 @ 11:00)  SpO2: 99% (11-06-19 @ 11:00)    LABS:                        11.7   11.28 )-----------( 250      ( 06 Nov 2019 00:45 )             37.5     05 Nov 2019 18:15    135    |  100    |  12     ----------------------------<  107    3.9     |  19     |  0.60     Ca    9.5        05 Nov 2019 18:15      Plan  - discussed above with Dr Escobar  - will send stat CBC   - 500cc additional fluid bolus ordered  - cont to closely monitor UO & vital signs    dw Dr Shawn kuhn pa      OB Attending  patient seen and agree with above.  Patient sitting up in PACU, no dizziness, CP, palpitations, SOB. Patient NPO for 8 hr before surgery.   VS 97/53 p 81  I -  2000 post op  O - 300 cc EBL per triton  urine 45, 45, 20    Abd soft NT/ND incision c/d/i  lochia min  Ext - c/c/e nt    IMP  POD#0 SROM Breech CS  low BP, asymptomatic  oliguria  PLAN  CBC sent  fluid bolus  ASIA Escobar

## 2019-11-06 NOTE — PROVIDER CONTACT NOTE (OTHER) - BACKGROUND
S/p c/s @ 38.4 wks for SROM/Breech, , 2L IVF, Output 100mL. In PACU Hypotensive, 1L bolus given with night RN and output 275mL first hour. 2nd hour output 40cc. Adequate is 52cc/hr for pt.
cesareansection patient from 11/6/19 @ 0512 s/p 1L bolus due to hypotension in L&D, and two 500cc boluses due to low urine output in L&D.

## 2019-11-06 NOTE — DISCHARGE NOTE OB - CARE PROVIDER_API CALL
Lila Juarez)  Obstetrics and Gynecology  [address], [address]  [place] [address]  Phone: [phone]  Fax: [phone]  Follow Up Time: 2 weeks

## 2019-11-06 NOTE — DISCHARGE NOTE OB - MATERIALS PROVIDED
Vaccinations/[facility]  Screening Program/  Immunization Record/Guide to Postpartum Care/[facility] Hearing Screen Program/Shaken Baby Prevention Handout/Birth Certificate Instructions

## 2019-11-06 NOTE — OB RN PATIENT PROFILE - NS_PLACENTAPOSESS_OBGYN_ALL_OB
Reason for visit: Breastfeeding questions; sleepy infant.     Breastfeeding experience/Education: Mom breast fed/pumped for 1 year with previous first children.      Mother's Breastfeeding Goal:  Mother would like to exclusively breast feed as long as possible.  Mother would like to avoid nipple shield use if possible.    Health History (pertinenet to milk production):  No risk factors present.     Breast Assessment: Mom has very large breasts and flatter nipples but they do juanis.     Feeding Assessment:  Observed infant go to breast in football hold.  Mom did well \"sandwiching\" her breast tissue and getting infant to latch on .  Mom has copious amount of colostrum she is very easily able to express.  Infant latches, take a few good sucks, then loses latch with cheek dimpling present.  Discussed suck training in between nursing sessions to help infant keep tongue down and anchoring the finger.  Was able to see some suckles bursts with swallows which were pointed out to mom.  Observed fair breast feeding for about 20 minutes.  Infant with sleepy periods in between suckling.      Feeding Plan of Care:  Observe for feeding cues.  Offer breast at least every 3 hours, but can hand express colostrum into infant's mouth if not latching at that time.  Suck training with finger between nursing sessions to help keep infant's tongue in proper position.     Education:  Educated mom on normal feeding pattern within first 24 hours of birth, suck training, infant stomach size and colostrum production and feeding cues.  New Beginnings booklet given and explained.  Lactation services and number given to mom for support if needed.  Encouraged mom to call with any needs or concerns.  Sherin Purcell RN, BSN, IBCLC      Spent 20 minutes in room.   No

## 2019-11-06 NOTE — BRIEF OPERATIVE NOTE - NSICDXBRIEFPOSTOP_GEN_ALL_CORE_FT
POST-OP DIAGNOSIS:  Breech presentation 06-Nov-2019 06:43:18  Marsha Martínez  Rupture of membranes with delay of delivery 06-Nov-2019 06:43:00 Rupture of membranes with delay of delivery Marsha Martínez

## 2019-11-07 LAB
BASOPHILS # BLD AUTO: 0.03 K/UL — SIGNIFICANT CHANGE UP (ref 0–0.2)
BASOPHILS NFR BLD AUTO: 0.4 % — SIGNIFICANT CHANGE UP (ref 0–2)
EOSINOPHIL # BLD AUTO: 0.08 K/UL — SIGNIFICANT CHANGE UP (ref 0–0.5)
EOSINOPHIL NFR BLD AUTO: 1 % — SIGNIFICANT CHANGE UP (ref 0–6)
HCT VFR BLD CALC: 32.4 % — LOW (ref 34.5–45)
HGB BLD-MCNC: 10.5 G/DL — LOW (ref 11.5–15.5)
IMM GRANULOCYTES NFR BLD AUTO: 0.6 % — SIGNIFICANT CHANGE UP (ref 0–1.5)
LYMPHOCYTES # BLD AUTO: 2.09 K/UL — SIGNIFICANT CHANGE UP (ref 1–3.3)
LYMPHOCYTES # BLD AUTO: 26.9 % — SIGNIFICANT CHANGE UP (ref 13–44)
MCHC RBC-ENTMCNC: 28.9 PG — SIGNIFICANT CHANGE UP (ref 27–34)
MCHC RBC-ENTMCNC: 32.4 % — SIGNIFICANT CHANGE UP (ref 32–36)
MCV RBC AUTO: 89.3 FL — SIGNIFICANT CHANGE UP (ref 80–100)
MONOCYTES # BLD AUTO: 0.4 K/UL — SIGNIFICANT CHANGE UP (ref 0–0.9)
MONOCYTES NFR BLD AUTO: 5.1 % — SIGNIFICANT CHANGE UP (ref 2–14)
NEUTROPHILS # BLD AUTO: 5.12 K/UL — SIGNIFICANT CHANGE UP (ref 1.8–7.4)
NEUTROPHILS NFR BLD AUTO: 66 % — SIGNIFICANT CHANGE UP (ref 43–77)
NRBC # FLD: 0 K/UL — SIGNIFICANT CHANGE UP (ref 0–0)
PLATELET # BLD AUTO: 215 K/UL — SIGNIFICANT CHANGE UP (ref 150–400)
PMV BLD: 10.7 FL — SIGNIFICANT CHANGE UP (ref 7–13)
RBC # BLD: 3.63 M/UL — LOW (ref 3.8–5.2)
RBC # FLD: 14.2 % — SIGNIFICANT CHANGE UP (ref 10.3–14.5)
WBC # BLD: 7.77 K/UL — SIGNIFICANT CHANGE UP (ref 3.8–10.5)
WBC # FLD AUTO: 7.77 K/UL — SIGNIFICANT CHANGE UP (ref 3.8–10.5)

## 2019-11-07 RX ORDER — IBUPROFEN 200 MG
600 TABLET ORAL EVERY 6 HOURS
Refills: 0 | Status: DISCONTINUED | OUTPATIENT
Start: 2019-11-07 | End: 2019-11-09

## 2019-11-07 RX ADMIN — HEPARIN SODIUM 5000 UNIT(S): 5000 INJECTION INTRAVENOUS; SUBCUTANEOUS at 13:24

## 2019-11-07 RX ADMIN — Medication 600 MILLIGRAM(S): at 18:00

## 2019-11-07 RX ADMIN — Medication 325 MILLIGRAM(S): at 11:50

## 2019-11-07 RX ADMIN — Medication 975 MILLIGRAM(S): at 21:20

## 2019-11-07 RX ADMIN — Medication 30 MILLIGRAM(S): at 06:05

## 2019-11-07 RX ADMIN — Medication 30 MILLIGRAM(S): at 02:05

## 2019-11-07 RX ADMIN — HEPARIN SODIUM 5000 UNIT(S): 5000 INJECTION INTRAVENOUS; SUBCUTANEOUS at 23:57

## 2019-11-07 RX ADMIN — Medication 88 MICROGRAM(S): at 05:34

## 2019-11-07 RX ADMIN — Medication 600 MILLIGRAM(S): at 13:00

## 2019-11-07 RX ADMIN — Medication 600 MILLIGRAM(S): at 23:57

## 2019-11-07 RX ADMIN — Medication 600 MILLIGRAM(S): at 17:11

## 2019-11-07 RX ADMIN — Medication 1 TABLET(S): at 11:50

## 2019-11-07 RX ADMIN — Medication 30 MILLIGRAM(S): at 01:36

## 2019-11-07 RX ADMIN — HEPARIN SODIUM 5000 UNIT(S): 5000 INJECTION INTRAVENOUS; SUBCUTANEOUS at 01:36

## 2019-11-07 RX ADMIN — Medication 975 MILLIGRAM(S): at 20:51

## 2019-11-07 RX ADMIN — Medication 600 MILLIGRAM(S): at 11:49

## 2019-11-07 RX ADMIN — Medication 30 MILLIGRAM(S): at 05:34

## 2019-11-07 NOTE — PROGRESS NOTE ADULT - SUBJECTIVE AND OBJECTIVE BOX
Postop Day  __1_ s/p   C- Section    THERAPY:    [  x] Spinal morphine .15____ mg  [  ] Epidural morphine ___ mg  [  ] IV PCA Hydromophone 1 mg/ml    OBJECTIVE:    Sedation Score:	  [ x ] Alert	    [  ] Drowsy        [  ] Arousable	[  ] Asleep	[  ] Unresponsive    Side Effects:	  [ x ] None	     [  ] Nausea        [  ] Pruritus        [  ] Weaknes   [  ] Numbness   [  ] Other:        ASSESSMENT/ PLAN  [  ] Continue   [  ] Discpntinue   [ x ]Documentation and Verification of current medications     Comments: no anesthetic comps. noted

## 2019-11-07 NOTE — PROGRESS NOTE ADULT - PROBLEM SELECTOR PLAN 1
- Continue regular diet  - Increase ambulation  - Continue motrin, tylenol, oxycodone PRN for pain control.    - f/u AM CBC    Liz Torres, PGY-1

## 2019-11-07 NOTE — PROGRESS NOTE ADULT - SUBJECTIVE AND OBJECTIVE BOX
Postpartum Note,  Section  She is a  34y woman who is now post-operative day: 1    Subjective:  The patient feels well.  She is ambulating.   She is tolerating regular diet.  She denies nausea and vomiting.  She is voiding.  Her pain is controlled.  She reports normal postpartum bleeding.  She is breastfeeding.    Physical exam:    Vital Signs Last 24 Hrs  T(C): 36.7 (2019 21:17), Max: 37 (2019 14:25)  T(F): 98.1 (2019 21:17), Max: 98.6 (2019 14:25)  HR: 75 (2019 21:17) (66 - 79)  BP: 103/52 (2019 21:17) (98/59 - 105/55)  BP(mean): --  RR: 17 (2019 21:17) (14 - 18)  SpO2: 98% (2019 21:17) (98% - 100%)    Gen: NAD  Breast: Soft, nontender, not engorged.  Abdomen: Soft, nontender, no distension , firm uterine fundus at umbilicus.  Incision: Clean, dry, and intact  Pelvic: Normal lochia noted  Ext: No calf tenderness    LABS:                        10.5   7.77  )-----------( 215      ( 2019 06:05 )             32.4       Rubella status:     Allergies    Avelox (Anaphylaxis)  Ceclor (Anaphylaxis; Rash; Hives)  Duricef (Anaphylaxis; Rash)    Intolerances      MEDICATIONS  (STANDING):  acetaminophen   Tablet .. 975 milliGRAM(s) Oral <User Schedule>  diphtheria/tetanus/pertussis (acellular) Vaccine (ADAcel) 0.5 milliLiter(s) IntraMuscular once  ferrous    sulfate 325 milliGRAM(s) Oral daily  heparin  Injectable 5000 Unit(s) SubCutaneous every 12 hours  ibuprofen  Tablet. 600 milliGRAM(s) Oral every 6 hours  levothyroxine 88 MICROGram(s) Oral daily  prenatal multivitamin 1 Tablet(s) Oral daily    MEDICATIONS  (PRN):  diphenhydrAMINE 25 milliGRAM(s) Oral every 6 hours PRN Itching  glycerin Suppository - Adult 1 Suppository(s) Rectal at bedtime PRN Constipation  lanolin Ointment 1 Application(s) Topical every 6 hours PRN Sore Nipples  magnesium hydroxide Suspension 30 milliLiter(s) Oral two times a day PRN Constipation  oxyCODONE    IR 5 milliGRAM(s) Oral every 3 hours PRN Moderate to Severe Pain (4-10)  oxyCODONE    IR 5 milliGRAM(s) Oral once PRN Moderate to Severe Pain (4-10)  senna 2 Tablet(s) Oral at bedtime PRN Constipation  simethicone 80 milliGRAM(s) Chew every 4 hours PRN Gas        Assessment and Plan  POD # 1 s/p  section  Doing well.  Encourage ambulation.

## 2019-11-07 NOTE — PROGRESS NOTE ADULT - SUBJECTIVE AND OBJECTIVE BOX
OB Progress Note:  Delivery, POD#1    S: 35yo POD#1 s/p LTCS . Pain well controlled, tolerating regular diet, not yet passing flatus, ambulating without difficulty, voiding spontaneously. Denies heavy vaginal bleeding, N/V, CP/SOB/lightheadedness/dizziness.     O:   Vital Signs Last 24 Hrs  T(C): 36.6 (2019 02:53), Max: 36.6 (2019 02:53)  T(F): 97.9 (2019 02:53), Max: 97.9 (2019 02:53)  HR: 66 (2019 02:53) (66 - 97)  BP: 98/59 (2019 02:53) (74/56 - 121/54)  BP(mean): 65 (2019 13:00) (56 - 87)  RR: 14 (2019 02:53) (14 - 26)  SpO2: 100% (2019 02:53) (96% - 100%)    Labs:  Blood type: B Positive  Rubella IgG: RPR: Negative                          11.2<L>   11.08<H> >-----------< 219    (  @ 11:30 )             34.6                        11.7   11.28<H> >-----------< 250    (  @ 00:45 )             37.5                        11.7   10.26 >-----------< 274    (  @ 18:15 )             36.4    19 @ 18:15      135  |  100  |  12  ----------------------------<  107<H>  3.9   |  19<L>  |  0.60        Ca    9.5      2019 18:15            PE:  General: NAD  Abdomen: Mildly distended, appropriately tender, Fundus firm, incision c/d/i.  VE: No heavy vaginal bleeding  Extremities: No erythema, no pitting edema

## 2019-11-08 RX ADMIN — Medication 600 MILLIGRAM(S): at 21:08

## 2019-11-08 RX ADMIN — Medication 975 MILLIGRAM(S): at 10:12

## 2019-11-08 RX ADMIN — Medication 600 MILLIGRAM(S): at 13:13

## 2019-11-08 RX ADMIN — Medication 1 TABLET(S): at 17:14

## 2019-11-08 RX ADMIN — HEPARIN SODIUM 5000 UNIT(S): 5000 INJECTION INTRAVENOUS; SUBCUTANEOUS at 13:13

## 2019-11-08 RX ADMIN — Medication 600 MILLIGRAM(S): at 00:30

## 2019-11-08 RX ADMIN — Medication 600 MILLIGRAM(S): at 05:40

## 2019-11-08 RX ADMIN — Medication 600 MILLIGRAM(S): at 20:29

## 2019-11-08 RX ADMIN — Medication 975 MILLIGRAM(S): at 11:00

## 2019-11-08 RX ADMIN — Medication 325 MILLIGRAM(S): at 17:13

## 2019-11-08 RX ADMIN — Medication 600 MILLIGRAM(S): at 05:11

## 2019-11-08 RX ADMIN — Medication 600 MILLIGRAM(S): at 14:00

## 2019-11-08 RX ADMIN — Medication 88 MICROGRAM(S): at 05:30

## 2019-11-08 NOTE — PROGRESS NOTE ADULT - SUBJECTIVE AND OBJECTIVE BOX
SUBJECTIVE:    Pain: Controlled    Complaints: None    MILESTONES:    Alert and Oriented x 3  [ x ]  Out of bed/ ambulating. [ x ]  Flatus:   Positive [ x ]  Negative [  ]  Bowel movement  [  ] Positive [  ] Negative   Voiding [x  ] Due to void [  ]   Mohan/Indwelling catheter in place [  ]  Diet: Regular [ x ]  Clears [  ]  NPO [  ]    Infant feeding:  Breast [ X ]   Bottle [  ]  Both [  ]  Feeding related issues and/or concerns:      OBJECTIVE:  T(C): 36.3 (19 @ 05:35), Max: 37 (19 @ 14:25)  HR: 63 (19 @ 05:35) (63 - 79)  BP: 100/56 (19 @ 05:35) (99/56 - 103/52)  RR: 18 (19 @ 05:35) (17 - 18)  SpO2: 97% (19 @ 05:35) (97% - 100%)  Wt(kg): --                        10.5   7.77  )-----------( 215      ( 2019 06:05 )             32.4           Blood Type: B Positive    RPR: Negative          MEDICATIONS  (STANDING):  acetaminophen   Tablet .. 975 milliGRAM(s) Oral <User Schedule>  diphtheria/tetanus/pertussis (acellular) Vaccine (ADAcel) 0.5 milliLiter(s) IntraMuscular once  ferrous    sulfate 325 milliGRAM(s) Oral daily  heparin  Injectable 5000 Unit(s) SubCutaneous every 12 hours  ibuprofen  Tablet. 600 milliGRAM(s) Oral every 6 hours  levothyroxine 88 MICROGram(s) Oral daily  prenatal multivitamin 1 Tablet(s) Oral daily    MEDICATIONS  (PRN):  diphenhydrAMINE 25 milliGRAM(s) Oral every 6 hours PRN Itching  glycerin Suppository - Adult 1 Suppository(s) Rectal at bedtime PRN Constipation  lanolin Ointment 1 Application(s) Topical every 6 hours PRN Sore Nipples  magnesium hydroxide Suspension 30 milliLiter(s) Oral two times a day PRN Constipation  oxyCODONE    IR 5 milliGRAM(s) Oral every 3 hours PRN Moderate to Severe Pain (4-10)  oxyCODONE    IR 5 milliGRAM(s) Oral once PRN Moderate to Severe Pain (4-10)  senna 2 Tablet(s) Oral at bedtime PRN Constipation  simethicone 80 milliGRAM(s) Chew every 4 hours PRN Gas        ASSESSMENT:    34y     G   1   P 1001        PO Day#  2        Delivery: Primary [ X ]    Repeat [  ]       EBL - 299                                  Indication of procedure: Breech, with Ruptured Membranes    Condition: Stable    Past Medical History significant for: HPI:  34y.o. G1 at 38.3weeks presenting with complaints of leakage of fluid today at .  Patient reports experiencing a gush of fluid and has been leaking since, fluid is clear in nature.  Patient reports positive fetal movement, denies vaginal bleeding, denies contractions.    a/p course complicated by breech presentation  patient scheduled for primary c/s at 39w on 2019  GBS(-) (2019 23:06)      Current Issues:    Breasts:  Soft [x  ]   Engorged [  ]  Nipples:  Abdomen: Soft [ x ]   Distended [  ] Nontender [  ]     Bowel sounds :  Present [  ]  Absent [  ]   Fundus:  Firm [x  ]  Boggy [  ]    Abdominal incision: Clean, Dry and Intact [x  ]  Staples [  ] Steri Strips [  ] Dermabond [ X ] Sutures [  ]    Patient wearing abdominal binder for support.    Vaginal: Lochia:  Heavy [  ]  Moderate [ x ]   Scant [  ]    Extremities: Edema [X  ] Negative Jay's Sign [ X ] Nontender Gerardo  [ x ] Positive pedal pulses [  ]    Other relevant physical exam findings:      PLAN:    Plan: Increase ambulation, analgesia PRN and pain medication protocol standing oxycodone, ibuprofen and acetaminophen.    Diet: Regular diet    Continue routine post-operative and postpartum care.  Minimal Flatus, MOM daily, as needed, and Increase OOB/Ambulation.    Discharge Planning [ x ]    For discharge Today  [    ]    Consults:  Social Work [  ]  Lactation [ x ]  Other [         ]

## 2019-11-08 NOTE — PROGRESS NOTE ADULT - SUBJECTIVE AND OBJECTIVE BOX
Attending Note     POD 2 s/p LTCS   doing well   ambulating with no issues   voiding freely   + flatus small amount     AFVSS  Gen in NAD   CV RRR   Lungs: CTA b/l   Abd soft, fundus firm   Ext: trace edema b/l     A/P POD 2 s/p LTCS   doing well   routine breastfeeding support  routine post op care  d/c in AM     Yue Campa MD MSc

## 2019-11-09 VITALS
SYSTOLIC BLOOD PRESSURE: 115 MMHG | OXYGEN SATURATION: 96 % | HEART RATE: 58 BPM | RESPIRATION RATE: 18 BRPM | DIASTOLIC BLOOD PRESSURE: 66 MMHG | TEMPERATURE: 97 F

## 2019-11-09 RX ADMIN — Medication 600 MILLIGRAM(S): at 11:51

## 2019-11-09 RX ADMIN — Medication 1 TABLET(S): at 11:07

## 2019-11-09 RX ADMIN — Medication 600 MILLIGRAM(S): at 06:00

## 2019-11-09 RX ADMIN — Medication 325 MILLIGRAM(S): at 11:08

## 2019-11-09 RX ADMIN — HEPARIN SODIUM 5000 UNIT(S): 5000 INJECTION INTRAVENOUS; SUBCUTANEOUS at 00:26

## 2019-11-09 RX ADMIN — HEPARIN SODIUM 5000 UNIT(S): 5000 INJECTION INTRAVENOUS; SUBCUTANEOUS at 11:08

## 2019-11-09 RX ADMIN — Medication 600 MILLIGRAM(S): at 05:17

## 2019-11-09 RX ADMIN — Medication 600 MILLIGRAM(S): at 11:06

## 2019-11-09 RX ADMIN — Medication 88 MICROGRAM(S): at 05:16

## 2019-11-09 NOTE — PROGRESS NOTE ADULT - SUBJECTIVE AND OBJECTIVE BOX
Patient assessed at 1030.  Subjective  Pain: Patient denies any pain at the time of assessment. Pain being managed well by pain management protocol.  Complaints: Patient denies any headache, blur vision, dizziness and/or weakness/fatigue. Patient reports a small nonpruritic rash with no pain to upper left abdomen that she noticed postpartum. Patient states she had the same rash during the summer in pregnancy that was self resolving. Patient denies having any HSV lesions in the past but reports that was informed that she has been exposed shown in blood test, took valtrex during the pregnancy.  Patient denies any allergies and/or sensitivities at this time.   Milestones:  Alert and orientedx3. Out of bed ambulating. Positive flatus. Positive bowel movement. Voiding.  Tolerating a regular diet.  Infant feeding: breastfeeding and using breast pump  Feeding related issues and/or concerns: infant had issues latching, seen by lactation.     OBJECTIVE:  T(C): 36.3 (19 @ 06:59), Max: 36.7 (19 @ 14:53)  HR: 58 (19 @ 06:59) (58 - 78)  BP: 115/66 (19 @ 06:59) (102/61 - 121/62)  RR: 18 (19 @ 06:59) (18 - 18)  SpO2: 96% (19 @ 06:59) (96% - 99%)  Wt(kg): --          Blood Type: B Positive    RPR: Negative          MEDICATIONS  (STANDING):  acetaminophen   Tablet .. 975 milliGRAM(s) Oral <User Schedule>  diphtheria/tetanus/pertussis (acellular) Vaccine (ADAcel) 0.5 milliLiter(s) IntraMuscular once  ferrous    sulfate 325 milliGRAM(s) Oral daily  heparin  Injectable 5000 Unit(s) SubCutaneous every 12 hours  ibuprofen  Tablet. 600 milliGRAM(s) Oral every 6 hours  levothyroxine 88 MICROGram(s) Oral daily  prenatal multivitamin 1 Tablet(s) Oral daily    MEDICATIONS  (PRN):  diphenhydrAMINE 25 milliGRAM(s) Oral every 6 hours PRN Itching  glycerin Suppository - Adult 1 Suppository(s) Rectal at bedtime PRN Constipation  lanolin Ointment 1 Application(s) Topical every 6 hours PRN Sore Nipples  magnesium hydroxide Suspension 30 milliLiter(s) Oral two times a day PRN Constipation  oxyCODONE    IR 5 milliGRAM(s) Oral every 3 hours PRN Moderate to Severe Pain (4-10)  oxyCODONE    IR 5 milliGRAM(s) Oral once PRN Moderate to Severe Pain (4-10)  senna 2 Tablet(s) Oral at bedtime PRN Constipation  simethicone 80 milliGRAM(s) Chew every 4 hours PRN Gas        ASSESSMENT:    35y/o     Day#3    primary post-operative  section delivery for breech  Condition: Stable  Past Medical/Surgical/OB/GYN History significant for: hypothyroidism on synthroid, 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  Breasts: escoto, nontender  Nipples: intact  Abdomen: Soft, nondistended and nontender. Bowel sounds present. Fundus firm  Abdominal incision: Clean, dry and intact with dermabond. Patient wearing abdominal binder for support.  Vaginal: Lochia light rubra  Extremities: Slight edema noted bilaterally and equal to lower extremities, nontender with no erythremic areas noted. Positive pedal pulses. No palpable veins noted  Other relevant physical exam findings: small pinsize pustule with erythemic base noted to left upper abdomen close mid axillary line. Nontender to touch.     Plan  Follow up outpatient if rash to left upper abdomen increase and/or becomes painful.  Continue routine post-operative and postpartum care  Increase ambulation, analgesia PRN and pain medication protocol of standing ibuprofen and acetaminophen and oxycodone prn  Encouraged to wear abdominal binder for support and to use incentive spirometer  Discussed breast/nipple/engorgement care for a breastfeeding.   Discharge to home today. Discussed discharge planning.     Discharge to home today. Discussed discharge planning. Patient assessed at 1030.  Subjective  Pain: Patient denies any pain at the time of assessment. Pain being managed well by pain management protocol.  Complaints: Patient denies any headache, blur vision, dizziness and/or weakness/fatigue. Patient reports a small nonpruritic rash with no pain to upper left abdomen that she noticed postpartum. Patient states she had the same rash during the summer in pregnancy that was self resolving. Patient denies having any HSV lesions in the past but reports that was informed that she has been exposed shown in blood test, took valtrex during the pregnancy. Patient states she believes rash during was related to a bug bite. Patient denies any allergies and/or sensitivities at this time.   Milestones:  Alert and orientedx3. Out of bed ambulating. Positive flatus. Positive bowel movement. Voiding.  Tolerating a regular diet.  Infant feeding: breastfeeding and using breast pump  Feeding related issues and/or concerns: infant had issues latching, seen by lactation.     OBJECTIVE:  T(C): 36.3 (19 @ 06:59), Max: 36.7 (19 @ 14:53)  HR: 58 (19 @ 06:59) (58 - 78)  BP: 115/66 (19 @ 06:59) (102/61 - 121/62)  RR: 18 (19 @ 06:59) (18 - 18)  SpO2: 96% (19 @ 06:59) (96% - 99%)  Wt(kg): --          Blood Type: B Positive    RPR: Negative          MEDICATIONS  (STANDING):  acetaminophen   Tablet .. 975 milliGRAM(s) Oral <User Schedule>  diphtheria/tetanus/pertussis (acellular) Vaccine (ADAcel) 0.5 milliLiter(s) IntraMuscular once  ferrous    sulfate 325 milliGRAM(s) Oral daily  heparin  Injectable 5000 Unit(s) SubCutaneous every 12 hours  ibuprofen  Tablet. 600 milliGRAM(s) Oral every 6 hours  levothyroxine 88 MICROGram(s) Oral daily  prenatal multivitamin 1 Tablet(s) Oral daily    MEDICATIONS  (PRN):  diphenhydrAMINE 25 milliGRAM(s) Oral every 6 hours PRN Itching  glycerin Suppository - Adult 1 Suppository(s) Rectal at bedtime PRN Constipation  lanolin Ointment 1 Application(s) Topical every 6 hours PRN Sore Nipples  magnesium hydroxide Suspension 30 milliLiter(s) Oral two times a day PRN Constipation  oxyCODONE    IR 5 milliGRAM(s) Oral every 3 hours PRN Moderate to Severe Pain (4-10)  oxyCODONE    IR 5 milliGRAM(s) Oral once PRN Moderate to Severe Pain (4-10)  senna 2 Tablet(s) Oral at bedtime PRN Constipation  simethicone 80 milliGRAM(s) Chew every 4 hours PRN Gas        ASSESSMENT:    33y/o     Day#3    primary post-operative  section delivery for breech  Condition: Stable  Past Medical/Surgical/OB/GYN History significant for: hypothyroidism on synthroid, 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  Breasts: escoto, nontender  Nipples: intact  Abdomen: Soft, nondistended and nontender. Bowel sounds present. Fundus firm  Abdominal incision: Clean, dry and intact with dermabond. Patient wearing abdominal binder for support.  Vaginal: Lochia light rubra  Extremities: Slight edema noted bilaterally and equal to lower extremities, nontender with no erythremic areas noted. Positive pedal pulses. No palpable veins noted  Other relevant physical exam findings: small pinsize pustule with erythemic base noted to left upper abdomen close mid axillary line. Nontender to touch.     Plan  Follow up outpatient if rash to left upper abdomen increase and/or becomes painful.  Continue routine post-operative and postpartum care  Increase ambulation, analgesia PRN and pain medication protocol of standing ibuprofen and acetaminophen and oxycodone prn  Encouraged to wear abdominal binder for support and to use incentive spirometer  Discussed breast/nipple/engorgement care for a breastfeeding.   Discharge to home today. Discussed discharge planning.     Discharge to home today. Discussed discharge planning. Patient assessed at 1030.  Subjective  Pain: Patient denies any pain at the time of assessment. Pain being managed well by pain management protocol.  Complaints: Patient denies any headache, blur vision, dizziness and/or weakness/fatigue. Patient reports a small nonpruritic rash with no pain to upper left abdomen that she noticed postpartum. Patient states she had the same rash during the summer in pregnancy that was self resolving. Patient denies having any HSV lesions in the past but reports that was informed that she has been exposed shown in blood test, took valtrex during the pregnancy. Patient states she believes rash during was related to a bug bite. Patient denies any allergies and/or sensitivities at this time.   Milestones:  Alert and orientedx3. Out of bed ambulating. Positive flatus. Positive bowel movement. Voiding.  Tolerating a regular diet.  Infant feeding: breastfeeding and using breast pump  Feeding related issues and/or concerns: infant had issues latching, seen by lactation.     OBJECTIVE:  T(C): 36.3 (19 @ 06:59), Max: 36.7 (19 @ 14:53)  HR: 58 (19 @ 06:59) (58 - 78)  BP: 115/66 (19 @ 06:59) (102/61 - 121/62)  RR: 18 (19 @ 06:59) (18 - 18)  SpO2: 96% (19 @ 06:59) (96% - 99%)  Wt(kg): --          Blood Type: B Positive    RPR: Negative          MEDICATIONS  (STANDING):  acetaminophen   Tablet .. 975 milliGRAM(s) Oral <User Schedule>  diphtheria/tetanus/pertussis (acellular) Vaccine (ADAcel) 0.5 milliLiter(s) IntraMuscular once  ferrous    sulfate 325 milliGRAM(s) Oral daily  heparin  Injectable 5000 Unit(s) SubCutaneous every 12 hours  ibuprofen  Tablet. 600 milliGRAM(s) Oral every 6 hours  levothyroxine 88 MICROGram(s) Oral daily  prenatal multivitamin 1 Tablet(s) Oral daily    MEDICATIONS  (PRN):  diphenhydrAMINE 25 milliGRAM(s) Oral every 6 hours PRN Itching  glycerin Suppository - Adult 1 Suppository(s) Rectal at bedtime PRN Constipation  lanolin Ointment 1 Application(s) Topical every 6 hours PRN Sore Nipples  magnesium hydroxide Suspension 30 milliLiter(s) Oral two times a day PRN Constipation  oxyCODONE    IR 5 milliGRAM(s) Oral every 3 hours PRN Moderate to Severe Pain (4-10)  oxyCODONE    IR 5 milliGRAM(s) Oral once PRN Moderate to Severe Pain (4-10)  senna 2 Tablet(s) Oral at bedtime PRN Constipation  simethicone 80 milliGRAM(s) Chew every 4 hours PRN Gas        ASSESSMENT:    33y/o     Day#3    primary post-operative  section delivery for breech  Condition: Stable  Past Medical/Surgical/OB/GYN History significant for: hypothyroidism on synthroid, 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  Breasts: escoto, nontender  Nipples: intact  Abdomen: Soft, nondistended and nontender. Bowel sounds present. Fundus firm  Abdominal incision: Clean, dry and intact with dermabond. Patient wearing abdominal binder for support.  Vaginal: Lochia light rubra  Extremities: Slight edema noted bilaterally and equal to lower extremities, nontender with no erythremic areas noted. Positive pedal pulses. No palpable veins noted  Other relevant physical exam findings: small pinsize pustule with erythemic base noted to left upper abdomen close mid axillary line. Nontender to touch.     Plan  Follow up outpatient if rash to left upper abdomen increase and/or becomes painful.  Continue routine post-operative and postpartum care  Increase ambulation, analgesia PRN and pain medication protocol of standing ibuprofen and acetaminophen and oxycodone prn  Encouraged to wear abdominal binder for support and to use incentive spirometer  Discussed breast/nipple/engorgement care for a breastfeeding.   Discharge to home today. Discussed discharge planning.

## 2020-03-16 NOTE — OB RN TRIAGE NOTE - NS_SISCREENINGSR_GEN_ALL_ED
Scheduled for 3/18/20 consult with Dr. Washington.    Left message to reschedule to after 4/15/20 due to COVID-19.    Negative

## 2021-02-21 NOTE — OB PROVIDER DELIVERY SUMMARY - NSPLACDELIVDETAIL_OBGYN_ALL_OB
INTERNAL MEDICINE RESIDENCY PROGRESS NOTE     Name: Yury Delacruz  Age & Sex: 77 y o  male   MRN: [id number]  Unit/Bed#: [facility] 624-01   Encounter: [id number]  Team: SOD Team B     PATIENT INFORMATION     Name: Yury Delacruz  Age & Sex: 77 y o  male   MRN: [id number]  Hospital Stay Days: 5    ASSESSMENT/PLAN     Principal Problem:    Sepsis [facility])  Active Problems:    Hyperlipidemia    Type 2 diabetes mellitus with diabetic neuropathy (HCC)    Atrial fibrillation with RVR (HCC)    Essential hypertension    Congenital talipes equinovarus, right foot    Post-operative state    Cellulitis of extremity    Abscess    Drug-induced constipation      Drug-induced constipation  Assessment & Plan  Resolved  - Encourage PO intake  - Continue senna, miralax    Essential hypertension  Assessment & Plan  - patient has history of longstanding hypertension on lisinopril 40mg daily and Cardizem 120mg BID  - continue lisinopril  - cardizem to change to metoprolol as detailed elsewhere  - trend BMP     Atrial fibrillation with RVR ([facility] )  Assessment & Plan  Patient has longstanding history of atrial fibrillation on Cardizem 120 BID for rate control and Eliquis for anticoagulation  Does not follow with cardiology outpatient  - Continue eliquis   - no indication to trend INR unless useful to surgical team  - change diltiazem 120 BID to metoprolol for rate control, goal HR <100   - given lopressor 5mg x 1 on 2/18 for RVR (asymptomatic)   - HR initially improved, however worsened again to 120s-170s, still asymptomatic    - will change to metoprolol 25 BID, likely will need to uptitrate in the acute setting    - metoprolol 5mg IV q6 PRN for HR >120  - telemetry given continued RVR    Type 2 diabetes mellitus with diabetic neuropathy ([facility] )  Assessment & Plan  On sitagliptin, metformin, and glimepiride outpatient: all held on admission  Not previously on insulin    SSI ordered, some hyperglycemia noted this morning     - Lantus started, 10U qhs  Goal blood glucose 140-180    Hyperlipidemia  Assessment & Plan  Continue atorvastatin 40mg    * Sepsis ([address] )  Assessment & Plan  Due to infected hardware/hematoma of R foot, now status post removal and washout, POD#4  - per podiatry: monitor for demarcation prior to amputation  - probably OR Monday  - blood cultures negative x 72 hours  - ID consulted  - Zosyn       Disposition: Per primary team  Continue to follow     SUBJECTIVE     Patient seen and examined  No acute events overnight  He reports loose stool starting overnight and requests decrease in bowel regimen  He denies fever or chills  He has not noticed any palpitations or chest pain  OBJECTIVE     Vitals:    21 2225 21 0306 21 0839 21 1214   BP: 153/88 152/86 154/93 147/80   Pulse:       Resp:   18   Temp: 99 4 °F (37 4 °C) 98 5 °F (36 9 °C) 98 6 °F (37 °C) 98 1 °F (36 7 °C)   TempSrc:       SpO2:       Weight:       Height:          Temperature:   Temp (24hrs), Av 6 °F (37 °C), Min:98 1 °F (36 7 °C), Max:99 4 °F (37 4 °C)    Temperature: 98 1 °F (36 7 °C)  Intake & Output:  I/O        07 -  0700  07 -  0700  07 -  0700    P  O  1120      I V  (mL/kg) 1340 (12 3) 1718 8 (15 8)     IV Piggyback 250 200     Total Intake(mL/kg) 2710 (24 9) 1918 8 (17 6)     Urine (mL/kg/hr) 2725 (1) 2125 (0 8) 475 (0 9)    Stool 0 0 0    Total Output 2725 2125 475    Net -15 -206 3 -475           Unmeasured Urine Occurrence 3 x 1 x     Unmeasured Stool Occurrence 0 x 1 x 1 x        Weights:   IBW: 86 8 kg    Body mass index is 29 33 kg/m²  Weight (last 2 days)     None        Physical Exam  Constitutional:       General: He is not in acute distress  Appearance: He is well-developed  HENT:      Head: Normocephalic and atraumatic  Eyes:      Conjunctiva/sclera: Conjunctivae normal    Cardiovascular:      Rate and Rhythm: Tachycardia present  Rhythm irregular        Heart sounds: Normal heart sounds  No murmur  Pulmonary:      Effort: Pulmonary effort is normal       Breath sounds: Normal breath sounds  No wheezing or rales  Abdominal:      General: Bowel sounds are normal       Palpations: Abdomen is soft  Tenderness: There is no abdominal tenderness  Musculoskeletal:         General: No deformity  Skin:     General: Skin is warm and dry  Neurological:      Mental Status: He is alert and oriented to person, place, and time  Psychiatric:         Mood and Affect: Mood normal          Behavior: Behavior normal        LABORATORY DATA     Labs: I have personally reviewed pertinent reports  Results from last 7 days   Lab Units 02/21/21  1002 02/20/21  0546 02/19/21  0519  02/16/21  1648   WBC Thousand/uL 9 16 9 89 9 06   < > 14 65*   HEMOGLOBIN g/dL 11 8* 10 6* 10 0*   < > 12 3   HEMATOCRIT % 36 5 32 3* 31 4*   < > 37 3   PLATELETS Thousands/uL 360 261 219   < > 186   NEUTROS PCT %  --   --   --   --  88*   MONOS PCT %  --   --   --   --  6    < > = values in this interval not displayed  Results from last 7 days   Lab Units 02/21/21  1002 02/20/21  0546 02/19/21  0519  02/16/21  1648   POTASSIUM mmol/L 3 7 3 6 3 9   < > 3 7   CHLORIDE mmol/L 110* 109* 109*   < > 106   CO2 mmol/L 27 25 24   < > 22   BUN mg/dL 10 10 11   < > 12   CREATININE mg/dL 0 93 0 73 0 74   < > 1 05   CALCIUM mg/dL 8 9 8 4 8 5   < > 8 8   ALK PHOS U/L  --   --   --   --  120*   ALT U/L  --   --   --   --  61   AST U/L  --   --   --   --  52*    < > = values in this interval not displayed  Results from last 7 days   Lab Units 02/18/21  0521 02/17/21  0752 02/16/21  1648   INR  1 36* 1 47* 1 48*   PTT seconds  --   --  37     Results from last 7 days   Lab Units 02/16/21  1855   LACTIC ACID mmol/L 1 2     Results from last 7 days   Lab Units 02/16/21  1648   TROPONIN I ng/mL <0 02       IMAGING & DIAGNOSTIC TESTING     Radiology Results: I have personally reviewed pertinent reports    Xr Foot 2 Vw Right    Result Date: 2/17/2021  Impression: Findings suspicious for infection involving the 4th and 5th tarsometatarsal joints, less conspicuous previously  Status post surgery Workstation performed: [id number]     Xr Foot Right 3+ Views    Result Date: 2/18/2021  Impression: Removal of the external fixation device  Otherwise stable alignment of the post surgical changes  Workstation performed: [id number]     Other Diagnostic Testing: I have personally reviewed pertinent reports      ACTIVE MEDICATIONS     Current Facility-Administered Medications   Medication Dose Route Frequency    acetaminophen (TYLENOL) tablet 650 mg  650 mg Oral Q6H PRN    apixaban (ELIQUIS) tablet 5 mg  5 mg Oral BID    atorvastatin (LIPITOR) tablet 40 mg  40 mg Oral Daily    HYDROmorphone (DILAUDID) injection 0 5 mg  0 5 mg Intravenous Q4H PRN    insulin glargine (LANTUS) subcutaneous injection 10 Units 0 1 mL  10 Units Subcutaneous HS    insulin lispro (HumaLOG) 100 units/mL subcutaneous injection 1-6 Units  1-6 Units Subcutaneous 4x Daily (AC & HS)    lisinopril (ZESTRIL) tablet 40 mg  40 mg Oral Daily    metoprolol (LOPRESSOR) injection 5 mg  5 mg Intravenous Q6H PRN    metoprolol tartrate (LOPRESSOR) tablet 25 mg  25 mg Oral Q12H SUNG    oxyCODONE (ROXICODONE) immediate release tablet 10 mg  10 mg Oral Q4H PRN    oxyCODONE (ROXICODONE) IR tablet 5 mg  5 mg Oral Q4H PRN    piperacillin-tazobactam (ZOSYN) 4 5 g in sodium chloride 0 9 % 100 mL IVPB  4 5 g Intravenous Q6H    polyethylene glycol (MIRALAX) packet 17 g  17 g Oral Daily PRN    potassium chloride (K-DUR,KLOR-CON) CR tablet 40 mEq  40 mEq Oral Once    [START ON 2/22/2021] senna-docusate sodium (SENOKOT S) 8 6-50 mg per tablet 1 tablet  1 tablet Oral HS    sodium chloride 0 9 % infusion  75 mL/hr Intravenous Continuous       VTE Pharmacologic Prophylaxis: Reason for no pharmacologic prophylaxis Eliquis  VTE Mechanical Prophylaxis: sequential compression device    Portions of the record may have been created with voice recognition software  Occasional wrong word or "sound a like" substitutions may have occurred due to the inherent limitations of voice recognition software    Read the chart carefully and recognize, using context, where substitutions have occurred   ==  Dick Cancino, DO  [address]  Internal Medicine Residency PGY-3 Manual Removal

## 2021-10-03 PROBLEM — Z00.00 ENCOUNTER FOR PREVENTIVE HEALTH EXAMINATION: Status: ACTIVE | Noted: 2021-10-03

## 2021-10-04 ENCOUNTER — APPOINTMENT (OUTPATIENT)
Dept: MATERNAL FETAL MEDICINE | Facility: CLINIC | Age: 36
End: 2021-10-04

## 2021-10-04 ENCOUNTER — APPOINTMENT (OUTPATIENT)
Dept: ANTEPARTUM | Facility: CLINIC | Age: 36
End: 2021-10-04
Payer: COMMERCIAL

## 2021-10-04 ENCOUNTER — ASOB RESULT (OUTPATIENT)
Age: 36
End: 2021-10-04

## 2021-10-04 ENCOUNTER — APPOINTMENT (OUTPATIENT)
Dept: ANTEPARTUM | Facility: CLINIC | Age: 36
End: 2021-10-04

## 2021-10-04 DIAGNOSIS — E16.2 HYPOGLYCEMIA, UNSPECIFIED: ICD-10-CM

## 2021-10-04 DIAGNOSIS — Z83.42 FAMILY HISTORY OF FAMILIAL HYPERCHOLESTEROLEMIA: ICD-10-CM

## 2021-10-04 DIAGNOSIS — Z83.3 FAMILY HISTORY OF DIABETES MELLITUS: ICD-10-CM

## 2021-10-04 DIAGNOSIS — E03.9 HYPOTHYROIDISM, UNSPECIFIED: ICD-10-CM

## 2021-10-04 DIAGNOSIS — O09.521 SUPERVISION OF ELDERLY MULTIGRAVIDA, FIRST TRIMESTER: ICD-10-CM

## 2021-10-04 DIAGNOSIS — Z78.9 OTHER SPECIFIED HEALTH STATUS: ICD-10-CM

## 2021-10-04 PROCEDURE — 76813 OB US NUCHAL MEAS 1 GEST: CPT | Mod: 59

## 2021-10-04 PROCEDURE — 76801 OB US < 14 WKS SINGLE FETUS: CPT

## 2021-10-12 ENCOUNTER — FORM ENCOUNTER (OUTPATIENT)
Age: 36
End: 2021-10-12

## 2021-12-01 ENCOUNTER — APPOINTMENT (OUTPATIENT)
Dept: ANTEPARTUM | Facility: CLINIC | Age: 36
End: 2021-12-01
Payer: COMMERCIAL

## 2021-12-01 ENCOUNTER — ASOB RESULT (OUTPATIENT)
Age: 36
End: 2021-12-01

## 2021-12-01 PROCEDURE — 76811 OB US DETAILED SNGL FETUS: CPT

## 2021-12-16 ENCOUNTER — ASOB RESULT (OUTPATIENT)
Age: 36
End: 2021-12-16

## 2021-12-16 ENCOUNTER — APPOINTMENT (OUTPATIENT)
Dept: ANTEPARTUM | Facility: CLINIC | Age: 36
End: 2021-12-16
Payer: COMMERCIAL

## 2021-12-16 PROCEDURE — 76816 OB US FOLLOW-UP PER FETUS: CPT

## 2022-01-27 ENCOUNTER — APPOINTMENT (OUTPATIENT)
Dept: ANTEPARTUM | Facility: CLINIC | Age: 37
End: 2022-01-27
Payer: COMMERCIAL

## 2022-01-27 ENCOUNTER — ASOB RESULT (OUTPATIENT)
Age: 37
End: 2022-01-27

## 2022-01-27 PROCEDURE — 76819 FETAL BIOPHYS PROFIL W/O NST: CPT

## 2022-01-27 PROCEDURE — 76816 OB US FOLLOW-UP PER FETUS: CPT

## 2022-01-31 ENCOUNTER — APPOINTMENT (OUTPATIENT)
Dept: MATERNAL FETAL MEDICINE | Facility: CLINIC | Age: 37
End: 2022-01-31
Payer: COMMERCIAL

## 2022-01-31 ENCOUNTER — ASOB RESULT (OUTPATIENT)
Age: 37
End: 2022-01-31

## 2022-01-31 DIAGNOSIS — O24.419 GESTATIONAL DIABETES MELLITUS IN PREGNANCY, UNSPECIFIED CONTROL: ICD-10-CM

## 2022-01-31 PROCEDURE — G0108 DIAB MANAGE TRN  PER INDIV: CPT | Mod: 95

## 2022-01-31 RX ORDER — LANCETS 33 GAUGE
EACH MISCELLANEOUS
Qty: 4 | Refills: 2 | Status: ACTIVE | COMMUNITY
Start: 2022-01-31 | End: 1900-01-01

## 2022-01-31 RX ORDER — BLOOD-GLUCOSE METER
W/DEVICE KIT MISCELLANEOUS
Qty: 1 | Refills: 0 | Status: ACTIVE | COMMUNITY
Start: 2022-01-31 | End: 1900-01-01

## 2022-01-31 RX ORDER — URINE ACETONE TEST STRIPS
STRIP MISCELLANEOUS
Qty: 1 | Refills: 0 | Status: ACTIVE | COMMUNITY
Start: 2022-01-31 | End: 1900-01-01

## 2022-02-07 ENCOUNTER — ASOB RESULT (OUTPATIENT)
Age: 37
End: 2022-02-07

## 2022-02-07 ENCOUNTER — APPOINTMENT (OUTPATIENT)
Dept: MATERNAL FETAL MEDICINE | Facility: CLINIC | Age: 37
End: 2022-02-07
Payer: COMMERCIAL

## 2022-02-07 PROCEDURE — G0108 DIAB MANAGE TRN  PER INDIV: CPT | Mod: 95

## 2022-02-07 RX ORDER — ISOPROPYL ALCOHOL 0.7 ML/ML
SWAB TOPICAL
Qty: 2 | Refills: 2 | Status: ACTIVE | COMMUNITY
Start: 2022-02-07 | End: 1900-01-01

## 2022-02-16 ENCOUNTER — ASOB RESULT (OUTPATIENT)
Age: 37
End: 2022-02-16

## 2022-02-16 ENCOUNTER — APPOINTMENT (OUTPATIENT)
Dept: MATERNAL FETAL MEDICINE | Facility: CLINIC | Age: 37
End: 2022-02-16
Payer: COMMERCIAL

## 2022-02-16 PROCEDURE — G0108 DIAB MANAGE TRN  PER INDIV: CPT | Mod: 95

## 2022-02-18 ENCOUNTER — NON-APPOINTMENT (OUTPATIENT)
Age: 37
End: 2022-02-18

## 2022-02-24 ENCOUNTER — APPOINTMENT (OUTPATIENT)
Dept: ANTEPARTUM | Facility: CLINIC | Age: 37
End: 2022-02-24
Payer: COMMERCIAL

## 2022-02-24 ENCOUNTER — APPOINTMENT (OUTPATIENT)
Dept: MATERNAL FETAL MEDICINE | Facility: CLINIC | Age: 37
End: 2022-02-24
Payer: COMMERCIAL

## 2022-02-24 ENCOUNTER — ASOB RESULT (OUTPATIENT)
Age: 37
End: 2022-02-24

## 2022-02-24 PROCEDURE — G0108 DIAB MANAGE TRN  PER INDIV: CPT | Mod: 95

## 2022-02-24 PROCEDURE — 76816 OB US FOLLOW-UP PER FETUS: CPT

## 2022-02-24 PROCEDURE — 76819 FETAL BIOPHYS PROFIL W/O NST: CPT

## 2022-03-09 RX ORDER — BLOOD-GLUCOSE METER
KIT MISCELLANEOUS 4 TIMES DAILY
Qty: 4 | Refills: 1 | Status: ACTIVE | COMMUNITY
Start: 2022-01-31 | End: 1900-01-01

## 2022-03-10 ENCOUNTER — APPOINTMENT (OUTPATIENT)
Dept: MATERNAL FETAL MEDICINE | Facility: CLINIC | Age: 37
End: 2022-03-10
Payer: COMMERCIAL

## 2022-03-10 ENCOUNTER — ASOB RESULT (OUTPATIENT)
Age: 37
End: 2022-03-10

## 2022-03-10 PROCEDURE — G0108 DIAB MANAGE TRN  PER INDIV: CPT | Mod: 95

## 2022-03-16 ENCOUNTER — APPOINTMENT (OUTPATIENT)
Dept: ANTEPARTUM | Facility: CLINIC | Age: 37
End: 2022-03-16

## 2022-03-16 ENCOUNTER — OUTPATIENT (OUTPATIENT)
Dept: INPATIENT UNIT | Facility: HOSPITAL | Age: 37
LOS: 1 days | Discharge: HOME | End: 2022-03-16
Payer: COMMERCIAL

## 2022-03-16 ENCOUNTER — ASOB RESULT (OUTPATIENT)
Age: 37
End: 2022-03-16

## 2022-03-16 ENCOUNTER — APPOINTMENT (OUTPATIENT)
Dept: ANTEPARTUM | Facility: CLINIC | Age: 37
End: 2022-03-16
Payer: COMMERCIAL

## 2022-03-16 VITALS — SYSTOLIC BLOOD PRESSURE: 121 MMHG | DIASTOLIC BLOOD PRESSURE: 74 MMHG | HEART RATE: 85 BPM

## 2022-03-16 VITALS — HEART RATE: 91 BPM | SYSTOLIC BLOOD PRESSURE: 123 MMHG | DIASTOLIC BLOOD PRESSURE: 72 MMHG

## 2022-03-16 DIAGNOSIS — Z98.890 OTHER SPECIFIED POSTPROCEDURAL STATES: Chronic | ICD-10-CM

## 2022-03-16 DIAGNOSIS — K08.409 PARTIAL LOSS OF TEETH, UNSPECIFIED CAUSE, UNSPECIFIED CLASS: Chronic | ICD-10-CM

## 2022-03-16 DIAGNOSIS — Z87.2 PERSONAL HISTORY OF DISEASES OF THE SKIN AND SUBCUTANEOUS TISSUE: Chronic | ICD-10-CM

## 2022-03-16 DIAGNOSIS — O26.899 OTHER SPECIFIED PREGNANCY RELATED CONDITIONS, UNSPECIFIED TRIMESTER: ICD-10-CM

## 2022-03-16 DIAGNOSIS — Z3A.00 WEEKS OF GESTATION OF PREGNANCY NOT SPECIFIED: ICD-10-CM

## 2022-03-16 LAB
APPEARANCE UR: ABNORMAL
BACTERIA # UR AUTO: ABNORMAL
BILIRUB UR-MCNC: NEGATIVE — SIGNIFICANT CHANGE UP
COLOR SPEC: SIGNIFICANT CHANGE UP
DIFF PNL FLD: NEGATIVE — SIGNIFICANT CHANGE UP
EPI CELLS # UR: 12 /HPF — HIGH (ref 0–5)
GLUCOSE UR QL: NEGATIVE — SIGNIFICANT CHANGE UP
HYALINE CASTS # UR AUTO: 2 /LPF — SIGNIFICANT CHANGE UP (ref 0–7)
KETONES UR-MCNC: ABNORMAL
LEUKOCYTE ESTERASE UR-ACNC: ABNORMAL
NITRITE UR-MCNC: NEGATIVE — SIGNIFICANT CHANGE UP
PH UR: 6.5 — SIGNIFICANT CHANGE UP (ref 5–8)
PROT UR-MCNC: NEGATIVE — SIGNIFICANT CHANGE UP
RBC CASTS # UR COMP ASSIST: 1 /HPF — SIGNIFICANT CHANGE UP (ref 0–4)
SP GR SPEC: 1.01 — SIGNIFICANT CHANGE UP (ref 1–1.05)
UROBILINOGEN FLD QL: SIGNIFICANT CHANGE UP
WBC UR QL: 14 /HPF — HIGH (ref 0–5)

## 2022-03-16 PROCEDURE — 76818 FETAL BIOPHYS PROFILE W/NST: CPT

## 2022-03-16 PROCEDURE — 99204 OFFICE O/P NEW MOD 45 MIN: CPT

## 2022-03-16 NOTE — OB PROVIDER TRIAGE NOTE - NSHPLABSRESULTS_GEN_ALL_CORE
Urinalysis Basic - ( 16 Mar 2022 21:14 )    Color: Light Yellow / Appearance: Slightly Turbid / S.011 / pH: x  Gluc: x / Ketone: Moderate  / Bili: Negative / Urobili: <2 mg/dL   Blood: x / Protein: Negative / Nitrite: Negative   Leuk Esterase: Moderate / RBC: 1 /HPF / WBC 14 /HPF   Sq Epi: x / Non Sq Epi: 12 /HPF / Bacteria: Few

## 2022-03-16 NOTE — OB RN TRIAGE NOTE - FALL HARM RISK - UNIVERSAL INTERVENTIONS
Bed in lowest position, wheels locked, appropriate side rails in place/Call bell, personal items and telephone in reach/Instruct patient to call for assistance before getting out of bed or chair/Non-slip footwear when patient is out of bed/[place] to call system/Physically safe environment - no spills, clutter or unnecessary equipment/Purposeful Proactive Rounding/Room/bathroom lighting operational, light cord in reach

## 2022-03-16 NOTE — OB PROVIDER TRIAGE NOTE - HISTORY OF PRESENT ILLNESS
35 yo AMA obese  @ 35.6 wks c/o occasional abd pressure she mentioned at M today. denies vb lof or contractions, +GFM. AP course complicated by GDMA2 on insulin. denies fever chills ha dysuria new swelling vision changes epigastric pain cp sob or cough. last saw OB 3 weeks ago scheduled 3/17 for next visit. MFM today for NST and BPP 2/2 GDMA. scheduled for repeat c/s 22 but desires to TOLAC. pt reports feels better while here.    meds: levothyroxine 112mcg, insulin 18 units q hs, PNV  all: ceclor, avelox, duricef- rash  PMH: hypothyroidism hypoglycemia  PSH: pylenoidal cyst, c/s x 1  gyn hx: denies  ob hx: breech primary c/s for breech and PROM 2019 male 7#12

## 2022-03-16 NOTE — OB PROVIDER TRIAGE NOTE - NSOBPROVIDERNOTE_OBGYN_ALL_OB_FT
d/w Dr Escobar discharge home no evidence of PTL @ 35.6 wks  maternal and fetal surveillance reassuring  rest activity as tolerated  increase water intake   labor precautions instructed  keep all OB appointments next visit 3/17/22  return for vaginal bleeding leakage of fluid decreased fetal movement or any concerns  v/w discharge instructions given with verbal understanding by patient

## 2022-03-16 NOTE — OB PROVIDER TRIAGE NOTE - NSHPPHYSICALEXAM_GEN_ALL_CORE
Abd soft gravid NT  TAS; vertex  SVE: 0/50/-3  FHT: moderate variability + accelerations negative decelerations  toco: occasional not feeling  Vital Signs Last 24 Hrs  T(C): 36.9 (16 Mar 2022 19:22), Max: 36.9 (16 Mar 2022 19:22)  T(F): 98.4 (16 Mar 2022 19:22), Max: 98.4 (16 Mar 2022 19:22)  HR: 85 (16 Mar 2022 20:41) (85 - 91)  BP: 121/74 (16 Mar 2022 20:41) (121/74 - 123/72)  BP(mean): --  RR: 16 (16 Mar 2022 19:22) (16 - 16)  SpO2: --

## 2022-03-24 ENCOUNTER — ASOB RESULT (OUTPATIENT)
Age: 37
End: 2022-03-24

## 2022-03-24 ENCOUNTER — APPOINTMENT (OUTPATIENT)
Dept: ANTEPARTUM | Facility: CLINIC | Age: 37
End: 2022-03-24
Payer: COMMERCIAL

## 2022-03-24 ENCOUNTER — APPOINTMENT (OUTPATIENT)
Dept: MATERNAL FETAL MEDICINE | Facility: CLINIC | Age: 37
End: 2022-03-24
Payer: COMMERCIAL

## 2022-03-24 PROCEDURE — 76818 FETAL BIOPHYS PROFILE W/NST: CPT

## 2022-03-24 PROCEDURE — 76816 OB US FOLLOW-UP PER FETUS: CPT

## 2022-03-24 PROCEDURE — G0108 DIAB MANAGE TRN  PER INDIV: CPT | Mod: 95

## 2022-03-29 ENCOUNTER — OUTPATIENT (OUTPATIENT)
Dept: OUTPATIENT SERVICES | Facility: HOSPITAL | Age: 37
LOS: 1 days | End: 2022-03-29

## 2022-03-29 VITALS
OXYGEN SATURATION: 98 % | HEIGHT: 64.5 IN | SYSTOLIC BLOOD PRESSURE: 112 MMHG | HEART RATE: 90 BPM | DIASTOLIC BLOOD PRESSURE: 75 MMHG | TEMPERATURE: 99 F | WEIGHT: 248.02 LBS | RESPIRATION RATE: 18 BRPM

## 2022-03-29 DIAGNOSIS — Z98.890 OTHER SPECIFIED POSTPROCEDURAL STATES: Chronic | ICD-10-CM

## 2022-03-29 DIAGNOSIS — Z34.90 ENCOUNTER FOR SUPERVISION OF NORMAL PREGNANCY, UNSPECIFIED, UNSPECIFIED TRIMESTER: ICD-10-CM

## 2022-03-29 DIAGNOSIS — Z87.2 PERSONAL HISTORY OF DISEASES OF THE SKIN AND SUBCUTANEOUS TISSUE: Chronic | ICD-10-CM

## 2022-03-29 DIAGNOSIS — O24.414 GESTATIONAL DIABETES MELLITUS IN PREGNANCY, INSULIN CONTROLLED: ICD-10-CM

## 2022-03-29 DIAGNOSIS — K08.409 PARTIAL LOSS OF TEETH, UNSPECIFIED CAUSE, UNSPECIFIED CLASS: Chronic | ICD-10-CM

## 2022-03-29 LAB
A1C WITH ESTIMATED AVERAGE GLUCOSE RESULT: 5.6 % — SIGNIFICANT CHANGE UP (ref 4–5.6)
ANION GAP SERPL CALC-SCNC: 12 MMOL/L — SIGNIFICANT CHANGE UP (ref 7–14)
APPEARANCE UR: CLEAR — SIGNIFICANT CHANGE UP
BILIRUB UR-MCNC: NEGATIVE — SIGNIFICANT CHANGE UP
BLD GP AB SCN SERPL QL: NEGATIVE — SIGNIFICANT CHANGE UP
BUN SERPL-MCNC: 14 MG/DL — SIGNIFICANT CHANGE UP (ref 7–23)
CALCIUM SERPL-MCNC: 9.5 MG/DL — SIGNIFICANT CHANGE UP (ref 8.4–10.5)
CHLORIDE SERPL-SCNC: 100 MMOL/L — SIGNIFICANT CHANGE UP (ref 98–107)
CO2 SERPL-SCNC: 22 MMOL/L — SIGNIFICANT CHANGE UP (ref 22–31)
COLOR SPEC: COLORLESS — SIGNIFICANT CHANGE UP
CREAT SERPL-MCNC: 0.64 MG/DL — SIGNIFICANT CHANGE UP (ref 0.5–1.3)
DIFF PNL FLD: NEGATIVE — SIGNIFICANT CHANGE UP
EGFR: 118 ML/MIN/1.73M2 — SIGNIFICANT CHANGE UP
ESTIMATED AVERAGE GLUCOSE: 114 — SIGNIFICANT CHANGE UP
GLUCOSE SERPL-MCNC: 69 MG/DL — LOW (ref 70–99)
GLUCOSE UR QL: NEGATIVE — SIGNIFICANT CHANGE UP
HCT VFR BLD CALC: 33.1 % — LOW (ref 34.5–45)
HGB BLD-MCNC: 10.9 G/DL — LOW (ref 11.5–15.5)
KETONES UR-MCNC: NEGATIVE — SIGNIFICANT CHANGE UP
LEUKOCYTE ESTERASE UR-ACNC: NEGATIVE — SIGNIFICANT CHANGE UP
MCHC RBC-ENTMCNC: 27 PG — SIGNIFICANT CHANGE UP (ref 27–34)
MCHC RBC-ENTMCNC: 32.9 GM/DL — SIGNIFICANT CHANGE UP (ref 32–36)
MCV RBC AUTO: 82.1 FL — SIGNIFICANT CHANGE UP (ref 80–100)
NITRITE UR-MCNC: NEGATIVE — SIGNIFICANT CHANGE UP
NRBC # BLD: 0 /100 WBCS — SIGNIFICANT CHANGE UP
NRBC # FLD: 0 K/UL — SIGNIFICANT CHANGE UP
PH UR: 5.5 — SIGNIFICANT CHANGE UP (ref 5–8)
PLATELET # BLD AUTO: 311 K/UL — SIGNIFICANT CHANGE UP (ref 150–400)
POTASSIUM SERPL-MCNC: 4.4 MMOL/L — SIGNIFICANT CHANGE UP (ref 3.5–5.3)
POTASSIUM SERPL-SCNC: 4.4 MMOL/L — SIGNIFICANT CHANGE UP (ref 3.5–5.3)
PROT UR-MCNC: NEGATIVE — SIGNIFICANT CHANGE UP
RBC # BLD: 4.03 M/UL — SIGNIFICANT CHANGE UP (ref 3.8–5.2)
RBC # FLD: 14.1 % — SIGNIFICANT CHANGE UP (ref 10.3–14.5)
RH IG SCN BLD-IMP: POSITIVE — SIGNIFICANT CHANGE UP
SODIUM SERPL-SCNC: 134 MMOL/L — LOW (ref 135–145)
SP GR SPEC: 1.01 — SIGNIFICANT CHANGE UP (ref 1–1.05)
UROBILINOGEN FLD QL: SIGNIFICANT CHANGE UP
WBC # BLD: 9.39 K/UL — SIGNIFICANT CHANGE UP (ref 3.8–10.5)
WBC # FLD AUTO: 9.39 K/UL — SIGNIFICANT CHANGE UP (ref 3.8–10.5)

## 2022-03-29 NOTE — OB PST NOTE - NSHPPHYSICALEXAM_GEN_ALL_CORE
Constitutional: Well Developed, Well Groomed, Well Nourished, No Distress    Eyes: EOM    Ears: Normal    Mouth & Gums: Normal    Pharynx: No tenderness, discharge    Tonsils: No Redness, discharge, tenderness, or swelling    Neck: Supple, normal thyroid gland    Breast: c/o bilateral tenderness    Back: ROM intact    Respiratory: CTA, no rales, no rhonchi, no wheezing    Cardiovascular:  Regular rate and rhythm, no rubs or murmur, S 1 S 2    Gastrointestinal: Soft, non-tender, non distention, no masses palpable, bowel sound normal, no abdominal pain    Extremities: slight pedal edema    Vascular:  Radial Pulse normal    Skin: warm and dry    Lymph Nodes: no enlarged lymph nodes    Musculoskeletal: ROM intact    Psychiatric: normal affect, normal behavior

## 2022-03-29 NOTE — OB PST NOTE - PROBLEM SELECTOR PLAN 1
This is  a 35 y/o female who is scheduled for repeat  on 22 with JATINDER 22  * Instructed to speak with surgeon regarding covid testing  * Given preop and cleanser instructions with good teach back and patient verbalized understanding   * Instructed to stop prenatal vitamins 10 days before surgery  * Instructed patient to speak with ob-gyn regarding preop medications and how much insulin to take This is  a 35 y/o female who is scheduled for repeat  on 22 with JATINDER 22  * Instructed to speak with surgeon regarding covid testing  * Given preop and cleanser instructions with good teach back and patient verbalized understanding   * Instructed to stop prenatal vitamins 10 days before surgery  * Instructed patient to speak with ob-gyn regarding preop medications and how much insulin to take the night before surgery

## 2022-03-29 NOTE — OB PST NOTE - HISTORY OF PRESENT ILLNESS
This is a 37 y/o female who is 37 weeks and 5 days gestation and who is scheduled for  repeat  on 22 with EDD4-. Last fetal movement a few minutes ago; denies vaginal bleeding

## 2022-03-29 NOTE — OB PST NOTE - NSHPREVIEWOFSYSTEMS_GEN_ALL_CORE
General: No fever, chills, sweating    Skin: dry skin on abdomen which is occasionally itchy; No rashes    Breast: b/l breast tenderness    Ophthalmologic: No diplopia, photophobia    ENMT Symptoms: c/o nasal congestion; No hearing difficulty    Respiratory and Thorax: c/o SOB over last few weeks; No wheezing, cough    Cardiovascular: No chest pain, palpitations    Gastrointestinal: No nausea, vomiting, diarrhea, constipation, abdominal pain    Genitourinary/ Pelvis: No hematuria, renal colic, or flank pain    Musculoskeletal: No arthralgia    Neurological: No seizures, no cva, no migraines    Psychiatric: No suicidal ideation, depression, anxiety    Hematology: b/l ankle edema; No gum bleeding, nose bleeding    Lymphatic: No enlarged or tender lymph nodes.    Endocrine: No heat or cold intolerance    Immunologic: No recurrent or persistent infections

## 2022-03-29 NOTE — OB PST NOTE - NSICDXPASTSURGICALHX_GEN_ALL_CORE_FT
PAST SURGICAL HISTORY:   delivery delivered 2019. delivered at 38 weeks gestation, baby boy weighing 7 pounds, 12 ounces    H/O pilonidal cyst     S/P eye surgery     [place] teeth removed

## 2022-03-29 NOTE — OB PST NOTE - SUBSTANCE USE COMMENT, PROFILE
Assessment/Plan:   Given no improvement in symptoms will start on antibiotic  Will start antiviral for shingles  Right upper lid horldeum to be treated with antibiotic ointment  Advised he still f/u with opthalmology  Diagnoses and all orders for this visit:    Acute bronchitis, unspecified organism  -     azithromycin (ZITHROMAX) 250 mg tablet; Take 2 tablets today then 1 tablet daily x 4 days    Sinusitis, unspecified chronicity, unspecified location  -     azithromycin (ZITHROMAX) 250 mg tablet; Take 2 tablets today then 1 tablet daily x 4 days    Herpes zoster without complication  -     valACYclovir (VALTREX) 1,000 mg tablet; Take 1 tablet (1,000 mg total) by mouth 2 (two) times a day for 7 days    Hordeolum externum of right upper eyelid  -     erythromycin (ILOTYCIN) ophthalmic ointment; Administer 0 5 inches to the right eye daily at bedtime    Other orders  -     metoprolol succinate (TOPROL-XL) 100 mg 24 hr tablet;   -     spironolactone-hydrochlorothiazide (ALDACTAZIDE) 25-25 mg per tablet;           Subjective:     Patient ID: Cheryle Skillern is a 71 y o  male  Has rash on chest  Started about 2 days ago  Slight itchy  Not painful  Getting worse  Spreading  Has nasal congestion and runny nose for 2 weeks  Not getting better  Having blurry vision right eye  No draiange  Thought he had stye but that is better  Has been going on for about 2 weeks  Has eye doctor appointment on Friday  Last eye exam a few months ago and all was normal  Sore throat is better  No ear pain  Denies sinus pain and pressure  Taking Coricidin which helps a little  Denies fever,chills  Review of Systems   Constitutional: Negative for chills and fever  HENT: Positive for congestion  Negative for ear pain, sinus pain, sinus pressure and sore throat  Eyes: Positive for visual disturbance  Respiratory: Positive for cough  Negative for shortness of breath and wheezing  Skin: Positive for rash  The following portions of the patient's history were reviewed and updated as appropriate: allergies, current medications, past family history, past medical history, past social history, past surgical history and problem list     Objective:  Vitals:    03/27/18 1534   BP: 124/70   Pulse: 78   Temp: (!) 97 4 °F (36 3 °C)      Physical Exam   Constitutional: He is oriented to person, place, and time  He appears well-developed and well-nourished  HENT:   Right Ear: Tympanic membrane, external ear and ear canal normal    Left Ear: Tympanic membrane, external ear and ear canal normal    Mouth/Throat: Uvula is midline, oropharynx is clear and moist and mucous membranes are normal    Cardiovascular: Normal rate, regular rhythm and normal heart sounds  Pulmonary/Chest: Effort normal and breath sounds normal    Lymphadenopathy:     He has no cervical adenopathy  Neurological: He is alert and oriented to person, place, and time  Skin: Skin is warm and dry  Rash noted  Rash is papular and vesicular  Psychiatric: He has a normal mood and affect  Vitals reviewed  denies recreational and illegal drug use

## 2022-03-29 NOTE — OB PST NOTE - NSICDXPASTMEDICALHX_GEN_ALL_CORE_FT
PAST MEDICAL HISTORY:  Gestational diabetes on insulin while pregnant    Hypoglycemia diagnosed as a teenager    Hypothyroidism dx @ 25 y.o. F/U with endocrinologist DR Renate Vanessa    Pregnant to have repeat  on 22    SOB (shortness of breath) COVID POSITIVE in      PAST MEDICAL HISTORY:  Gestational diabetes on insulin while pregnant for repeat  on 22    Hypoglycemia diagnosed as a teenager    Hypothyroidism dx @ 25 y.o. F/U with endocrinologist DR Renate Vanessa    Pregnant to have repeat  on 22    SOB (shortness of breath) COVID POSITIVE in

## 2022-03-31 ENCOUNTER — ASOB RESULT (OUTPATIENT)
Age: 37
End: 2022-03-31

## 2022-03-31 ENCOUNTER — APPOINTMENT (OUTPATIENT)
Dept: ANTEPARTUM | Facility: CLINIC | Age: 37
End: 2022-03-31
Payer: COMMERCIAL

## 2022-03-31 PROCEDURE — 76818 FETAL BIOPHYS PROFILE W/NST: CPT

## 2022-04-04 RX ORDER — INSULIN HUMAN 100 [IU]/ML
100 INJECTION, SUSPENSION SUBCUTANEOUS
Qty: 1 | Refills: 3 | Status: ACTIVE | COMMUNITY
Start: 2022-02-07 | End: 1900-01-01

## 2022-04-04 RX ORDER — ELECTROLYTES/DEXTROSE
32G X 4 MM SOLUTION, ORAL ORAL
Qty: 1 | Refills: 1 | Status: ACTIVE | COMMUNITY
Start: 2022-02-07 | End: 1900-01-01

## 2022-04-06 RX ORDER — SODIUM CHLORIDE 9 MG/ML
1000 INJECTION, SOLUTION INTRAVENOUS ONCE
Refills: 0 | Status: COMPLETED | OUTPATIENT
Start: 2022-04-08 | End: 2022-04-08

## 2022-04-06 RX ORDER — CITRIC ACID/SODIUM CITRATE 300-500 MG
30 SOLUTION, ORAL ORAL ONCE
Refills: 0 | Status: COMPLETED | OUTPATIENT
Start: 2022-04-08 | End: 2022-04-08

## 2022-04-06 RX ORDER — SODIUM CHLORIDE 9 MG/ML
1000 INJECTION, SOLUTION INTRAVENOUS
Refills: 0 | Status: DISCONTINUED | OUTPATIENT
Start: 2022-04-08 | End: 2022-04-08

## 2022-04-06 RX ORDER — FAMOTIDINE 10 MG/ML
20 INJECTION INTRAVENOUS ONCE
Refills: 0 | Status: COMPLETED | OUTPATIENT
Start: 2022-04-08 | End: 2022-04-08

## 2022-04-06 RX ORDER — LEVOTHYROXINE SODIUM 125 MCG
112 TABLET ORAL DAILY
Refills: 0 | Status: DISCONTINUED | OUTPATIENT
Start: 2022-04-09 | End: 2022-04-10

## 2022-04-07 ENCOUNTER — APPOINTMENT (OUTPATIENT)
Dept: ANTEPARTUM | Facility: CLINIC | Age: 37
End: 2022-04-07
Payer: COMMERCIAL

## 2022-04-07 ENCOUNTER — APPOINTMENT (OUTPATIENT)
Dept: ANTEPARTUM | Facility: CLINIC | Age: 37
End: 2022-04-07

## 2022-04-07 ENCOUNTER — ASOB RESULT (OUTPATIENT)
Age: 37
End: 2022-04-07

## 2022-04-07 ENCOUNTER — TRANSCRIPTION ENCOUNTER (OUTPATIENT)
Age: 37
End: 2022-04-07

## 2022-04-07 PROCEDURE — 76818 FETAL BIOPHYS PROFILE W/NST: CPT

## 2022-04-08 ENCOUNTER — TRANSCRIPTION ENCOUNTER (OUTPATIENT)
Age: 37
End: 2022-04-08

## 2022-04-08 ENCOUNTER — INPATIENT (INPATIENT)
Facility: HOSPITAL | Age: 37
LOS: 1 days | Discharge: HOME | End: 2022-04-10
Attending: OBSTETRICS & GYNECOLOGY | Admitting: OBSTETRICS & GYNECOLOGY

## 2022-04-08 VITALS
TEMPERATURE: 98 F | SYSTOLIC BLOOD PRESSURE: 124 MMHG | RESPIRATION RATE: 16 BRPM | HEART RATE: 101 BPM | DIASTOLIC BLOOD PRESSURE: 74 MMHG

## 2022-04-08 DIAGNOSIS — K08.409 PARTIAL LOSS OF TEETH, UNSPECIFIED CAUSE, UNSPECIFIED CLASS: Chronic | ICD-10-CM

## 2022-04-08 DIAGNOSIS — Z98.891 HISTORY OF UTERINE SCAR FROM PREVIOUS SURGERY: ICD-10-CM

## 2022-04-08 DIAGNOSIS — Z98.890 OTHER SPECIFIED POSTPROCEDURAL STATES: Chronic | ICD-10-CM

## 2022-04-08 DIAGNOSIS — O24.414 GESTATIONAL DIABETES MELLITUS IN PREGNANCY, INSULIN CONTROLLED: ICD-10-CM

## 2022-04-08 DIAGNOSIS — Z87.2 PERSONAL HISTORY OF DISEASES OF THE SKIN AND SUBCUTANEOUS TISSUE: Chronic | ICD-10-CM

## 2022-04-08 LAB
BASOPHILS # BLD AUTO: 0.03 K/UL — SIGNIFICANT CHANGE UP (ref 0–0.2)
BASOPHILS NFR BLD AUTO: 0.3 % — SIGNIFICANT CHANGE UP (ref 0–2)
COVID-19 SPIKE DOMAIN AB INTERP: POSITIVE
COVID-19 SPIKE DOMAIN ANTIBODY RESULT: >250 U/ML — HIGH
EOSINOPHIL # BLD AUTO: 0.06 K/UL — SIGNIFICANT CHANGE UP (ref 0–0.5)
EOSINOPHIL NFR BLD AUTO: 0.6 % — SIGNIFICANT CHANGE UP (ref 0–6)
GLUCOSE BLDC GLUCOMTR-MCNC: 176 MG/DL — HIGH (ref 70–99)
GLUCOSE BLDC GLUCOMTR-MCNC: 79 MG/DL — SIGNIFICANT CHANGE UP (ref 70–99)
GLUCOSE BLDC GLUCOMTR-MCNC: 93 MG/DL — SIGNIFICANT CHANGE UP (ref 70–99)
GLUCOSE BLDC GLUCOMTR-MCNC: 97 MG/DL — SIGNIFICANT CHANGE UP (ref 70–99)
HCT VFR BLD CALC: 34.7 % — SIGNIFICANT CHANGE UP (ref 34.5–45)
HGB BLD-MCNC: 11.1 G/DL — LOW (ref 11.5–15.5)
IANC: 6.76 K/UL — SIGNIFICANT CHANGE UP (ref 1.8–7.4)
IMM GRANULOCYTES NFR BLD AUTO: 0.5 % — SIGNIFICANT CHANGE UP (ref 0–1.5)
LYMPHOCYTES # BLD AUTO: 2.08 K/UL — SIGNIFICANT CHANGE UP (ref 1–3.3)
LYMPHOCYTES # BLD AUTO: 21.8 % — SIGNIFICANT CHANGE UP (ref 13–44)
MCHC RBC-ENTMCNC: 26.6 PG — LOW (ref 27–34)
MCHC RBC-ENTMCNC: 32 GM/DL — SIGNIFICANT CHANGE UP (ref 32–36)
MCV RBC AUTO: 83.2 FL — SIGNIFICANT CHANGE UP (ref 80–100)
MONOCYTES # BLD AUTO: 0.58 K/UL — SIGNIFICANT CHANGE UP (ref 0–0.9)
MONOCYTES NFR BLD AUTO: 6.1 % — SIGNIFICANT CHANGE UP (ref 2–14)
NEUTROPHILS # BLD AUTO: 6.76 K/UL — SIGNIFICANT CHANGE UP (ref 1.8–7.4)
NEUTROPHILS NFR BLD AUTO: 70.7 % — SIGNIFICANT CHANGE UP (ref 43–77)
NRBC # BLD: 0 /100 WBCS — SIGNIFICANT CHANGE UP
NRBC # FLD: 0 K/UL — SIGNIFICANT CHANGE UP
PLATELET # BLD AUTO: 308 K/UL — SIGNIFICANT CHANGE UP (ref 150–400)
RBC # BLD: 4.17 M/UL — SIGNIFICANT CHANGE UP (ref 3.8–5.2)
RBC # FLD: 14.4 % — SIGNIFICANT CHANGE UP (ref 10.3–14.5)
RH IG SCN BLD-IMP: POSITIVE — SIGNIFICANT CHANGE UP
SARS-COV-2 IGG+IGM SERPL QL IA: >250 U/ML — HIGH
SARS-COV-2 IGG+IGM SERPL QL IA: POSITIVE
T PALLIDUM AB TITR SER: NEGATIVE — SIGNIFICANT CHANGE UP
WBC # BLD: 9.56 K/UL — SIGNIFICANT CHANGE UP (ref 3.8–10.5)
WBC # FLD AUTO: 9.56 K/UL — SIGNIFICANT CHANGE UP (ref 3.8–10.5)

## 2022-04-08 DEVICE — SURGICEL SNOW 2 X 4": Type: IMPLANTABLE DEVICE | Status: FUNCTIONAL

## 2022-04-08 DEVICE — INTERCEED 3 X 4": Type: IMPLANTABLE DEVICE | Status: FUNCTIONAL

## 2022-04-08 RX ORDER — IBUPROFEN 200 MG
600 TABLET ORAL EVERY 6 HOURS
Refills: 0 | Status: COMPLETED | OUTPATIENT
Start: 2022-04-08 | End: 2023-03-07

## 2022-04-08 RX ORDER — HEPARIN SODIUM 5000 [USP'U]/ML
10000 INJECTION INTRAVENOUS; SUBCUTANEOUS EVERY 12 HOURS
Refills: 0 | Status: DISCONTINUED | OUTPATIENT
Start: 2022-04-08 | End: 2022-04-10

## 2022-04-08 RX ORDER — LANOLIN
1 OINTMENT (GRAM) TOPICAL EVERY 6 HOURS
Refills: 0 | Status: DISCONTINUED | OUTPATIENT
Start: 2022-04-08 | End: 2022-04-10

## 2022-04-08 RX ORDER — SODIUM CHLORIDE 9 MG/ML
500 INJECTION, SOLUTION INTRAVENOUS ONCE
Refills: 0 | Status: COMPLETED | OUTPATIENT
Start: 2022-04-08 | End: 2022-04-08

## 2022-04-08 RX ORDER — OXYTOCIN 10 UNIT/ML
333.33 VIAL (ML) INJECTION
Qty: 20 | Refills: 0 | Status: DISCONTINUED | OUTPATIENT
Start: 2022-04-08 | End: 2022-04-08

## 2022-04-08 RX ORDER — NALBUPHINE HYDROCHLORIDE 10 MG/ML
2.5 INJECTION, SOLUTION INTRAMUSCULAR; INTRAVENOUS; SUBCUTANEOUS EVERY 6 HOURS
Refills: 0 | Status: DISCONTINUED | OUTPATIENT
Start: 2022-04-09 | End: 2022-04-10

## 2022-04-08 RX ORDER — LEVOTHYROXINE SODIUM 125 MCG
112 TABLET ORAL DAILY
Refills: 0 | Status: DISCONTINUED | OUTPATIENT
Start: 2022-04-08 | End: 2022-04-08

## 2022-04-08 RX ORDER — MAGNESIUM HYDROXIDE 400 MG/1
30 TABLET, CHEWABLE ORAL
Refills: 0 | Status: DISCONTINUED | OUTPATIENT
Start: 2022-04-08 | End: 2022-04-10

## 2022-04-08 RX ORDER — OXYCODONE HYDROCHLORIDE 5 MG/1
5 TABLET ORAL ONCE
Refills: 0 | Status: DISCONTINUED | OUTPATIENT
Start: 2022-04-08 | End: 2022-04-10

## 2022-04-08 RX ORDER — OXYCODONE HYDROCHLORIDE 5 MG/1
5 TABLET ORAL
Refills: 0 | Status: DISCONTINUED | OUTPATIENT
Start: 2022-04-09 | End: 2022-04-09

## 2022-04-08 RX ORDER — DIPHENHYDRAMINE HCL 50 MG
25 CAPSULE ORAL EVERY 6 HOURS
Refills: 0 | Status: DISCONTINUED | OUTPATIENT
Start: 2022-04-08 | End: 2022-04-10

## 2022-04-08 RX ORDER — SODIUM CHLORIDE 9 MG/ML
1000 INJECTION, SOLUTION INTRAVENOUS
Refills: 0 | Status: DISCONTINUED | OUTPATIENT
Start: 2022-04-08 | End: 2022-04-08

## 2022-04-08 RX ORDER — IBUPROFEN 200 MG
1 TABLET ORAL
Qty: 0 | Refills: 0 | DISCHARGE
Start: 2022-04-08

## 2022-04-08 RX ORDER — TETANUS TOXOID, REDUCED DIPHTHERIA TOXOID AND ACELLULAR PERTUSSIS VACCINE, ADSORBED 5; 2.5; 8; 8; 2.5 [IU]/.5ML; [IU]/.5ML; UG/.5ML; UG/.5ML; UG/.5ML
0.5 SUSPENSION INTRAMUSCULAR ONCE
Refills: 0 | Status: DISCONTINUED | OUTPATIENT
Start: 2022-04-08 | End: 2022-04-10

## 2022-04-08 RX ORDER — SODIUM CHLORIDE 9 MG/ML
1000 INJECTION, SOLUTION INTRAVENOUS ONCE
Refills: 0 | Status: COMPLETED | OUTPATIENT
Start: 2022-04-08 | End: 2022-04-08

## 2022-04-08 RX ORDER — NALOXONE HYDROCHLORIDE 4 MG/.1ML
0.1 SPRAY NASAL
Refills: 0 | Status: DISCONTINUED | OUTPATIENT
Start: 2022-04-09 | End: 2022-04-10

## 2022-04-08 RX ORDER — KETOROLAC TROMETHAMINE 30 MG/ML
30 SYRINGE (ML) INJECTION EVERY 6 HOURS
Refills: 0 | Status: DISCONTINUED | OUTPATIENT
Start: 2022-04-08 | End: 2022-04-09

## 2022-04-08 RX ORDER — OXYCODONE HYDROCHLORIDE 5 MG/1
5 TABLET ORAL
Refills: 0 | Status: DISCONTINUED | OUTPATIENT
Start: 2022-04-08 | End: 2022-04-10

## 2022-04-08 RX ORDER — ACETAMINOPHEN 500 MG
3 TABLET ORAL
Qty: 0 | Refills: 0 | DISCHARGE
Start: 2022-04-08

## 2022-04-08 RX ORDER — ACETAMINOPHEN 500 MG
1000 TABLET ORAL ONCE
Refills: 0 | Status: COMPLETED | OUTPATIENT
Start: 2022-04-08 | End: 2022-04-08

## 2022-04-08 RX ORDER — SIMETHICONE 80 MG/1
80 TABLET, CHEWABLE ORAL EVERY 4 HOURS
Refills: 0 | Status: DISCONTINUED | OUTPATIENT
Start: 2022-04-08 | End: 2022-04-10

## 2022-04-08 RX ORDER — ACETAMINOPHEN 500 MG
975 TABLET ORAL
Refills: 0 | Status: DISCONTINUED | OUTPATIENT
Start: 2022-04-08 | End: 2022-04-10

## 2022-04-08 RX ADMIN — SODIUM CHLORIDE 75 MILLILITER(S): 9 INJECTION, SOLUTION INTRAVENOUS at 12:40

## 2022-04-08 RX ADMIN — SODIUM CHLORIDE 2000 MILLILITER(S): 9 INJECTION, SOLUTION INTRAVENOUS at 09:17

## 2022-04-08 RX ADMIN — Medication 400 MILLIGRAM(S): at 13:04

## 2022-04-08 RX ADMIN — SODIUM CHLORIDE 75 MILLILITER(S): 9 INJECTION, SOLUTION INTRAVENOUS at 16:16

## 2022-04-08 RX ADMIN — Medication 30 MILLIGRAM(S): at 17:30

## 2022-04-08 RX ADMIN — Medication 30 MILLIGRAM(S): at 17:00

## 2022-04-08 RX ADMIN — Medication 1000 MILLIGRAM(S): at 13:30

## 2022-04-08 RX ADMIN — Medication 30 MILLILITER(S): at 09:16

## 2022-04-08 RX ADMIN — SODIUM CHLORIDE 125 MILLILITER(S): 9 INJECTION, SOLUTION INTRAVENOUS at 09:17

## 2022-04-08 RX ADMIN — Medication 1000 MILLIUNIT(S)/MIN: at 12:39

## 2022-04-08 RX ADMIN — SODIUM CHLORIDE 1000 MILLILITER(S): 9 INJECTION, SOLUTION INTRAVENOUS at 14:00

## 2022-04-08 RX ADMIN — Medication 975 MILLIGRAM(S): at 20:30

## 2022-04-08 RX ADMIN — Medication 30 MILLIGRAM(S): at 23:59

## 2022-04-08 RX ADMIN — FAMOTIDINE 20 MILLIGRAM(S): 10 INJECTION INTRAVENOUS at 09:15

## 2022-04-08 RX ADMIN — SODIUM CHLORIDE 1000 MILLILITER(S): 9 INJECTION, SOLUTION INTRAVENOUS at 11:30

## 2022-04-08 RX ADMIN — Medication 975 MILLIGRAM(S): at 21:30

## 2022-04-08 RX ADMIN — HEPARIN SODIUM 10000 UNIT(S): 5000 INJECTION INTRAVENOUS; SUBCUTANEOUS at 18:13

## 2022-04-08 NOTE — CHART NOTE - NSCHARTNOTEFT_GEN_A_CORE
Patient stated feeling dizzy, stating she needs to eat.  ICU Vital Signs:  T(C): 36.7 (08 Apr 2022 08:42), Max: 36.7 (08 Apr 2022 07:49)  T(F): 98.1 (08 Apr 2022 08:42), Max: 98.1 (08 Apr 2022 08:42)  HR: 67 (08 Apr 2022 15:00) (62 - 101)  BP: 101/58 (08 Apr 2022 15:00) (87/44 - 124/74)  BP(mean): 68 (08 Apr 2022 15:00) (55 - 74)  RR: 23 (08 Apr 2022 15:00) (16 - 24)  SpO2: 98% (08 Apr 2022 15:00) (96% - 100%)  adequate urine output: 60-70cc/hr  FS 73  lochia moderate, no blood clots  fundus is firm  IVF bolus D5LR 300 cc given; Dr Patel was updated; will reevaluate in half an hour.    Nathalia Martinez CNM

## 2022-04-08 NOTE — OB POSTPARTUM EVENT NOTE - NS_EVENTPTSUMMARY1_OBGYN_ALL_OB_FT
s/p c/s, , IV FLUIDS 2000 ml  fundus firm, at umbilicus, light bleeding  BP 87/44, HR 90, RR 18, SPO2 99%  Patient feeling dizzy

## 2022-04-08 NOTE — OB RN INTRAOPERATIVE NOTE - NSSELHIDDEN_OBGYN_ALL_OB_FT
[NS_DeliveryAttending1_OBGYN_ALL_OB_FT:[id number]=],[NS_DeliveryAssist1_OBGYN_ALL_OB_FT:[id number]=],[NS_DeliveryRN_OBGYN_ALL_OB_FT:[id number]]

## 2022-04-08 NOTE — OB POSTPARTUM EVENT NOTE - NS_EVENTPTSUMMARY2_OBGYN_ALL_OB_FT
s/p c/s, , IV FLUIDS 2000 ml  fundus firm, at umbilicus, moderate bleeding  /56, HR 73 RR 20 SPO2 99%  LR bolus infusing

## 2022-04-08 NOTE — OB RN PATIENT PROFILE - FALL HARM RISK - UNIVERSAL INTERVENTIONS
Bed in lowest position, wheels locked, appropriate side rails in place/Call bell, personal items and telephone in reach/Instruct patient to call for assistance before getting out of bed or chair/Non-slip footwear when patient is out of bed/[place] to call system/Physically safe environment - no spills, clutter or unnecessary equipment/Purposeful Proactive Rounding/Room/bathroom lighting operational, light cord in reach

## 2022-04-08 NOTE — OB RN PATIENT PROFILE - NS MD HP INPLANTS MED DEV
[facility] hospitalist discharge summary     MRN: [id number], Cher Swartz is a 73 year old female admitted for .    Admission Dt/Tm     12/1/2021  5:42 AM  Discharge DT/TM  12/09/21    Hospital Course    Cher is a 73 year old female admitted for thoracoscopic left lung wedge resection, with completion upper lung lobectomy and mediastinal lymph node dissection. Patient was found to have a nodule in the left lung. Patient has a history of HLD, HTN and chronic bronchitis. Patient has a history of smoking since 1967.    CV surgery and cardiology were consulted.    Hospital course as below:       Physical Exam       PHYSICAL EXAM:  Vital Signs:    Vitals with min/max:      Vital Last Value 24 Hour Range   Temperature 98.2 °F (36.8 °C) (12/09/21 1030) Temp  Min: 97.4 °F (36.3 °C)  Max: 98.2 °F (36.8 °C)   Pulse 72 (12/09/21 1030) Pulse  Min: 72  Max: 117   Respiratory 19 (12/09/21 1030) Resp  Min: 14  Max: 24   Non-Invasive  Blood Pressure 101/63 (12/09/21 1030) BP  Min: 98/64  Max: 130/72   Pulse Oximetry 97 % (12/09/21 1030) SpO2  Min: 95 %  Max: 97 %   Arterial   Blood Pressure 121/59 (12/01/21 1140) No data recorded        Physical Exam  Vitals and nursing note reviewed.   HENT:      Head: Normocephalic.      Nose: Nose normal.      Mouth/Throat:      Mouth: Mucous membranes are moist.   Eyes:      Extraocular Movements: Extraocular movements intact.      Conjunctiva/sclera: Conjunctivae normal.   Neck:      Comments: Trachea midline  Cardiovascular:      Rate and Rhythm: Normal rate and regular rhythm.   Pulmonary:      Effort: Pulmonary effort is normal.      Comments: bs diminished  Abdominal:      Palpations: Abdomen is soft.   Musculoskeletal:         General: No swelling.   Skin:     General: Skin is warm and dry.   Neurological:      Mental Status: She is alert and oriented to person, place, and time. Mental status is at baseline.   Psychiatric:         Mood and Affect: Mood normal.         Behavior:  Behavior normal.      Comments: Cooperative on exam          Labs       Lab Results  Recent Results (from the past 48 hour(s))   Basic Metabolic Panel    Collection Time: 12/09/21  6:06 AM   Result Value Ref Range    Fasting Status      Sodium 137 135 - 145 mmol/L    Potassium 4.4 3.4 - 5.1 mmol/L    Chloride 106 98 - 107 mmol/L    Carbon Dioxide 27 21 - 32 mmol/L    Anion Gap 8 (L) 10 - 20 mmol/L    Glucose 111 (H) 70 - 99 mg/dL    BUN 15 6 - 20 mg/dL    Creatinine 0.85 0.51 - 0.95 mg/dL    Glomerular Filtration Rate 79 >=60    BUN/ Creatinine Ratio 18 7 - 25    Calcium 9.2 8.4 - 10.2 mg/dL   CBC with Automated Differential (performable only)    Collection Time: 12/09/21  6:06 AM   Result Value Ref Range    WBC 9.0 4.2 - 11.0 K/mcL    RBC 3.39 (L) 4.00 - 5.20 mil/mcL    HGB 10.4 (L) 12.0 - 15.5 g/dL    HCT 32.0 (L) 36.0 - 46.5 %    MCV 94.4 78.0 - 100.0 fl    MCH 30.7 26.0 - 34.0 pg    MCHC 32.5 32.0 - 36.5 g/dL    RDW-CV 13.4 11.0 - 15.0 %    RDW-SD 46.3 39.0 - 50.0 fL     140 - 450 K/mcL    NRBC 0 <=0 /100 WBC    Neutrophil, Percent 69 %    Lymphocytes, Percent 19 %    Mono, Percent 8 %    Eosinophils, Percent 3 %    Basophils, Percent 0 %    Immature Granulocytes 1 %    Absolute Neutrophils 6.2 1.8 - 7.7 K/mcL    Absolute Lymphocytes 1.8 1.0 - 4.0 K/mcL    Absolute Monocytes 0.7 0.3 - 0.9 K/mcL    Absolute Eosinophils  0.3 0.0 - 0.5 K/mcL    Absolute Basophils 0.0 0.0 - 0.3 K/mcL    Absolute Immmature Granulocytes 0.1 0.0 - 0.2 K/mcL        Pathology    Order Name Source Comment Collection Info Order Time   SURGICAL PATHOLOGY Lung, Left Upper Lobe  Collected By: Sumeet Sánchez MD 12/1/2021  8:40 AM     How should test results be released to the patient's [facility] portal?   Automatic Release            Test Results Pending At Discharge  Pending Labs     Order Current Status    Next Generation Sequencing Solid Tumor Panel 50 In process    Surgical Pathology In process          Imaging       Imaging    Encounter Date: 12/01/21   Electrocardiogram 12-Lead   Result Value    Ventricular Rate EKG/Min (BPM) 137    Atrial Rate (BPM) 174    QRS-Interval (MSEC) 74    QT-Interval (MSEC) 292    QTc 441    R Axis (Degrees) 43    T Axis (Degrees) 54    REPORT TEXT      Atrial fibrillation  with rapid ventricular response  Nonspecific ST abnormality  , probably digitalis effect  Abnormal ECG  When compared with ECG of  11-NOV-2021 13:40,  Atrial fibrillation  has replaced  Sinus rhythm  ST no longer depressed in  Inferior leads  Confirmed by FRED PANDYA MD ([id number]) on 12/4/2021 7:33:31 AM       XR CHEST PA OR AP 1 VIEW   Final Result   1.    Size increased small left pneumothorax.    2.    Left basilar atelectasis or evolving infiltrate and small volume   pleural fluid.  Suggest continued follow-up.      Electronically Signed by: ALEXANDRE HIRSCH D.O.    Signed on: 12/9/2021 7:25 AM          XR CHEST PA OR AP 1 VIEW   Final Result    Stable position of left chest tube with small left pneumothorax which   appears smaller compared to prior examination.      Electronically Signed by: SHELLEY SINGH M.D.    Signed on: 12/8/2021 8:58 AM          XR CHEST PA OR AP 1 VIEW   Final Result   1.    Interval slight increase in size of left apical pneumothorax and   small left basilar atelectasis.      Electronically Signed by: CIRA ACOSTA M.D.    Signed on: 12/7/2021 8:30 AM          XR CHEST PA AND LATERAL 2 VIEWS   Final Result   1.  Small-moderate left pneumothorax, increased from previous.   2.  Left base atelectasis.      **The absence of findings should not deter follow-up or further evaluation   of concerning clinical findings.      FOR PHYSICIAN USE ONLY: Please note that this report was generated using   voice recognition software.  If you require clarification or feel that   there is an error in this report, please contact me.      Electronically Signed by: MARLON PATRICIA M.D.    Signed on: 12/6/2021 8:46 AM          XR  CHEST PA OR AP 1 VIEW   Final Result   Interval decreased size of left-sided pneumothorax.   2.  Slight interval improved aeration at left lower lung zone.      Electronically Signed by: TOMI LEACH M.D.    Signed on: 12/4/2021 10:02 AM          XR CHEST PA OR AP 1 VIEW   Final Result   1.  Increased volume left pneumothorax.  Left chest tube remains in place.      Electronically Signed by: RAINE HANNON M.D.    Signed on: 12/3/2021 8:13 AM          XR CHEST PA OR AP 1 VIEW   Final Result   1.  Left chest tube in place with a left pneumothorax identified.   2.  Cardiomegaly with improving prominence of the pulmonary vascularity.   3.  Bilateral lower lobe areas of opacity.      Electronically Signed by: JUVENTINO SHAH M.D.    Signed on: 12/2/2021 7:53 AM          XR CHEST PA OR AP 1 VIEW   Final Result   FINDINGS/IMPRESSION: On this AP portable semiupright chest a nodular   density seen in the lateral aspect of the left upper lobe is not   definitively identified.  A left chest tube is in place without definitive   pneumothorax noted with a small amount of subcutaneous air identified.    Cardiomegaly is noted with prominence of the pulmonary vascularity which   could represent a component of congestive changes.  The visualized regional   skeleton is intact.  Gaseous distention of the stomach is noted.      Electronically Signed by: JUVENTINO SHAH M.D.    Signed on: 12/1/2021 10:58 AM              Discharge diagnosis         Assessment and Plan  Active Problems:    Apical lung nodule    Chest tube in place    Pneumothorax     Left upper lung nodule  -Thoracoscopic assisted left lung wedge resection w/ completion upper lung lobectomy and mediastinal lymph node dissection.  - Pain management with Norco  - on room air  - CXR 12/7: interval slight inc in size of left apical pneumothorax and small left basilar atelectasis  - Pathology pending  - continue Zofran  - continue NS   heimlich  in place,and pt to discharge  with this today    Addendum: Pathology report:  Invasive adenocarcinoma of lung, predominantly acinar, grade II (of III),      Left Pneumothorax  - Left CT removed  12/7 12/7chest xr as above  - 12/2 CXR: left CT in place with pneumothorax identified, improving prominence of pulmonary vascularity, bilateral lower lobe opacity.   - 12/4 CXR: interval dec size of left sided pneumothorax, slight interval improved aeration of left lower lung zone  12/6 chest xr results reviewed : Small-moderate left pneumothorax, increased from previous  Continue chest tube today, reevaluate tommorrow  - on room air  - CV Surgery 12/4: pt still has air leak from CT, and moderate drainage, keep CT in position  - CV Surgery 12/5: recc CXR tomorrow. Will placed back on water seal today     Atrial Fibrillation   - Now rate controlled  - On dig amiodarone  - Appreciate cv input   - 12/3 Cardiology: cont amiodarone, consider DOAC after CT removal  - 12/3 TTE: EF 60%, mild mitral regurgitation, mild/mod dilated left atrium, mildly inc systolic pressure in Right ventricle     Elevated Troponin no chest pain  Poss type 2 MI or demand ischemia  Will defer to cv  - 443 on 12/3, 105 on 12/4  - Await Cardiology input  - h/o smoking  - Cardiology 12/4: ekg did not show any st depression; elevated troponin may indicate type 2 MI or demand ischemia, anticoagulation recommended after CT removal     Hyperglycemia  113 this am on lab draw  - Cont to monitor      Leukocytosis-resolved  -18 on admit, 8.6 today  - Continue to monitor     HTN  - Continue Lisinopril  - 128/76 today     Upper/Lower Left Back Pain  -Continue to monitor  - managed with Norco prn     Chronic Bronchitis   - Continue Duoneb     Constipation  - Continue Milk of Magnesia     HLD  - Continue Atorvastatin     Current tobbacco use  Nicotine patch  Smoking cessation   Per cardiology she will likely need cad workup at some point given she has been a heavy smoker for 55  years.       Discharge instructions  Discharge pt home today if cleared by cv surgery and cardiology, pt to follow up with pcp within 3 days and consultants in one week or as directed. Drain care instructions per nursing.         Discharge medications     Discharge Medications       Summary of your Discharge Medications      Take these Medications      Details   acetaminophen 325 MG tablet  Commonly known as: TYLENOL   Take 2 tablets by mouth every 4 hours as needed for Pain.     AMIODarone 200 MG tablet  Commonly known as: PACERONE  Start taking on: December 10, 2021   Take 1 tablet by mouth daily. Do not start before December 10, 2021.     aspirin 81 MG EC tablet  Start taking on: December 10, 2021   Take 1 tablet by mouth daily. Do not start before December 10, 2021.     atorvastatin 20 MG tablet  Commonly known as: LIPITOR   Take 20 mg by mouth at bedtime.     calcium carbonate-vitamin D 600-400 MG-UNIT per tablet  Commonly known as: CALTRATE+D   Take 1 tablet by mouth 2 times daily.     docusate sodium-sennosides 50-8.6 MG per tablet  Commonly known as: SENOKOT S   Take 2 tablets by mouth 2 times daily as needed for Constipation.     ibuprofen 400 MG tablet  Commonly known as: MOTRIN   Take 1 tablet by mouth every 6 hours as needed for Pain.     lidocaine 4 % patch  Commonly known as: LIDOCARE  Start taking on: December 10, 2021   Place 1 patch onto the skin daily. Do not start before December 10, 2021.     lisinopril 20 MG tablet  Commonly known as: ZESTRIL   Take 20 mg by mouth every morning. Indications: High Blood Pressure Disorder     magnesium hydroxide 400 MG/5ML suspension  Commonly known as: MILK OF MAGNESIA   Take 30 mLs by mouth daily as needed for Constipation.     nicotine 14 MG/24HR patch  Commonly known as: NICODERM  Start taking on: December 10, 2021   Place 1 patch onto the skin daily. Do not start before December 10, 2021.     polyethylene glycol 17 g packet  Commonly known as: MIRALAX  Start  taking on: December 10, 2021   Take 17 g by mouth daily. Do not start before December 10, 2021. Stir and dissolve powder in any 4 to 8 ounces of beverage, then drink.     traMADol 50 MG tablet  Commonly known as: ULTRAM   Take 1 tablet by mouth every 6 hours as needed for Pain.            Smoking Cessation  Ready to quit: Not Answered  Counseling given: Not Answered  Comment: not ready to quit      Primary Care Physician  Julissa Ellis MD    IP Consult Orders (From admission, onward)             Start     Ordered    12/02/21 1905  Inpatient consult to Cardiology  ONE TIME        Provider:  Sherrell Yi MD    12/02/21 1904 12/01/21 1233  Inpatient consult to Hospitalist  ONE TIME        Provider:  Quentin Santana MD    12/01/21 1233                 Time spent on diagnosing, evaluating, discussing and examining at bedside of multiple medical conditions, nutrition status, and skin integrity that I am monitoring, managing, evaluating, supervising and treating, in addition to reviewing and adjusting  medications and interpreting diagnostic data, and direct communication with RN and consulting physicians, patients and family as well as PCP as well as discussing treatment options, meds and plan of care with patient and poa with more then half the time in counseling and coordination of care exceeded 34  min.    Shayy Mcrae NP BC  Quentin Santana MD FACP  Internal Medicine Hospitalist  [facility]  24 H Hospitalist Service with [facility]  [email or web address]  [phone] Answering Service  [phone] Cell Phone  Accepting HIPPA Compliant Txt via [employer]   None

## 2022-04-08 NOTE — OB PROVIDER H&P - HISTORY OF PRESENT ILLNESS
PA COVER MY MEDS  Medication:Xifaxan 550MG tablets  Key:[id number]  Status:PENDING 35 yo , EGA@39.1 weeks presented for scheduled repeat  section. Patient denies contractions/leaking fluid/vaginal bleeding,  reports + fetal movements

## 2022-04-08 NOTE — OB PROVIDER H&P - NSICDXPASTMEDICALHX_GEN_ALL_CORE_FT
PAST MEDICAL HISTORY:  Gestational diabetes on insulin while pregnant for repeat  on 22    Hypoglycemia diagnosed as a teenager    Hypothyroidism dx @ 25 y.o. F/U with endocrinologist DR Renate Vanessa    Pregnant to have repeat  on 22    SOB (shortness of breath) COVID POSITIVE in

## 2022-04-08 NOTE — OB RN PATIENT PROFILE - NSICDXPASTSURGICALHX_GEN_ALL_CORE_FT
PAST SURGICAL HISTORY:   delivery delivered 2019. delivered at 38 weeks gestation, baby boy weighing 7 pounds, 12 ounces    H/O pilonidal cyst     S/P eye surgery     [place] teeth removed

## 2022-04-08 NOTE — OB NEONATOLOGY/PEDIATRICIAN DELIVERY SUMMARY - NSPEDSNEONOTESA_OBGYN_ALL_OB_FT
Called to the OR to evaluate this baby at about 7mins of life  Baby is a 39wk F born via scheduled  to a 4yo  B+ mother. Maternal history significant for anxiety, hypothyroid, hypoglycemia, pilonidal cyst removal, LASIK eye surgery. PNL nrl/immune/-, GBS negative on 3/17. ROM at delivery with clear fluid. Baby emerged crying and vigorous, was w/d/s/s. Baby was started on CPAP 5 30% for low saturations. APGARS 8/9.   Met baby at 7MOL on CPAP with saturation in high 80s. Baby was continued on intermittent CPAP until about 25MOL due to low saturations and intermittent grunting despite very good cry and very pink color. Baby was suctioned multiple times.  Saturations eventually remained 94-96% without CPAP for over 5mins without any retractions, tachypnea, flaring or grunting. Mom would like to breastfeed. EOS 0.04. Baby was transferred to the NBN.

## 2022-04-08 NOTE — OB PROVIDER DELIVERY SUMMARY - NSSELHIDDEN_OBGYN_ALL_OB_FT
[NS_DeliveryAttending1_OBGYN_ALL_OB_FT:[id number]=],[NS_DeliveryAssist1_OBGYN_ALL_OB_FT:[id number]=],[NS_DeliveryRN_OBGYN_ALL_OB_FT:[id number]]

## 2022-04-08 NOTE — DISCHARGE NOTE OB - PATIENT PORTAL LINK FT
You can access the FollowMyHealth Patient Portal offered by [facility] by registering at the following website: [email or web address]. By joining [employer]’s FollowMyHealth portal, you will also be able to view your health information using other applications (apps) compatible with our system.

## 2022-04-08 NOTE — DISCHARGE NOTE OB - MEDICATION SUMMARY - MEDICATIONS TO TAKE
I will START or STAY ON the medications listed below when I get home from the hospital:    prenatal vitamins one tablet orally daily - to stop 10 days before surgery  -- Indication: For vit    ibuprofen 600 mg oral tablet  -- 1 tab(s) by mouth every 6 hours  -- Indication: For Pain    acetaminophen 325 mg oral tablet  -- 3 tab(s) by mouth every 8 hours  -- Indication: For Pain    levothyroxine 112 mcg (0.112 mg) oral tablet  -- 1 tab(s) by mouth once a day  -- Indication: For Hypothyroidsm

## 2022-04-08 NOTE — DISCHARGE NOTE OB - HAVE YOU HAD A FIRST COVID-19 BOOSTER?
Yes -Vt D 17.3  -c/w vitamin D supplement  -c/w diet with Ca/vt D  -f/u blood level as outpt -Vitamin D 17.3  -c/w vitamin D supplement

## 2022-04-08 NOTE — OB RN INTRAOPERATIVE NOTE - NS_IMPLANTS_INTERCEED ABSORB ADHESION EXPIRATION DATE_OBGYN_ALL_OB_DT
Essential hypertension
30-Apr-2026
No

## 2022-04-08 NOTE — DISCHARGE NOTE OB - MEDICATION SUMMARY - MEDICATIONS TO STOP TAKING
I will STOP taking the medications listed below when I get home from the hospital:    HumuLIN N 100 units/mL subcutaneous suspension  -- 18 units HS

## 2022-04-08 NOTE — OB PROVIDER H&P - NSICDXPASTSURGICALHX_GEN_ALL_CORE_FT
PAST SURGICAL HISTORY:   delivery delivered 2019. delivered at 38 weeks gestation, baby boy weighing 7 pounds, 12 ounces    H/O pilonidal cyst     S/P eye surgery     [place] teeth removed

## 2022-04-08 NOTE — OB PROVIDER H&P - ASSESSMENT
35 yo , EGA@39.1weeks, presented for scheduled repeat  section.  Patient denies contractions, reports  + fetal movements,  denies leaking of fluid, denies vaginal bleeding. Pt denies any other concerns.  Patient denies symptoms of COVID 19, denies recent illness, fully vaccinated.  Prenatal care with Dr. Patel.  Prenatal course is significant for GDM, A2.  GBS negative status.    OB hx: 2019,  section, PPROM, breech, [id number], uncomplicated   Med hx: hypothyroidism, hypoglycemia diagnosed as teenager; denies hx clotting or bleeding disorders HTN  Surg hx: 2019,  section               , pilonidal cystectomy  GYN hx: denies hx abnormal Papsmear, STIs, fibroids, polyps, cysts  Family hx: no hx congential disorders, bleeding/clotting disorders  Psych hx: denies   Social hx: denies ETOH, smoking, drugs. Safe at home, lives with a child and spouse    Meds: PNV qd; Levothyroxine 112 MICROGram(s) Oral daily; Humulin N 18 Units qhs   Allergy Avelox (Anaphylaxis)            Ceclor (Anaphylaxis; Rash; Hives)            Duricef (Anaphylaxis; Rash)    – Will accept blood transfusions? Yes    T(C): 37.2  HR: 101 (22 @ 07:49)   BP: 124/74 (22 @ 07:49)   RR: 16    – PE:   CV: RRR  Pulm: breathing comfortably on RA  Abd: gravid, nontender  Extr: moving all extremities with ease  NST is in progress  - EFW 3500 g  - vertex, anterior placenta     A: 35 yo, , EGA@39.1 weeks, scheduled for repeat  section.  P: admitted for repeat  section.    Patient discussed with attending physician, Dr. Patel.    Nathalia Martinez CNM

## 2022-04-08 NOTE — DISCHARGE NOTE OB - CARE PROVIDER_API CALL
Lila Juarez)  Obstetrics and Gynecology  [address], [address]  [place] [address]  Phone: [phone]  Fax: [phone]  Follow Up Time:

## 2022-04-08 NOTE — DISCHARGE NOTE OB - NS MD DC FALL RISK RISK
For information on Fall & Injury Prevention, visit: [email or web address] OR  [email or web address] OR  https://www.cdc.gov/steadi/patient.html

## 2022-04-08 NOTE — DISCHARGE NOTE OB - HOSPITAL COURSE
She had an repeat low transverse c/section for viable female infant   3 cm uterine window noted at the time of surgery

## 2022-04-08 NOTE — OB RN PREOPERATIVE CHECKLIST - TYPE OF SOLUTION
[facility] Medicine Daily Progress Note    Date of Service  2/6/2020    Chief Complaint  73 y.o. female admitted 1/27/2020 with Anxiety / Tremors / Ankle pain     Hospital Course    Patient presented with above concerns to the hospital, underlying history of alcoholism, recent stressors, admitted to the hospital for alcohol withdrawal/detoxification.  Hospitalization complicated by worsening alcohol withdrawal requiring transfer to the ICU on January 30, 2020, further complicated by development of respiratory failure requiring intubation.  Successfully extubated, clinically improved in the ICU and now transferred to the telemetry unit, [facility] medicine resuming care February 6, 2020      Interval Problem Update  Patient seen and evaluated on rounds  Discussed with nursing staff on rounds    Upon evaluation today, patient is alert and oriented x4.    Patient has a central line in place.  Patient has a fecal management system, Mora catheter and a cortrack in place.  Previously required restraints.    Discussed with nursing, no indications to proceed with restraints at this time.    Patient is alert and oriented x4, diarrhea is likely related to the tube feeds.  Mora is present.  Advised nursing, to remove the fecal management system, Mora catheter and advised patient to get up to the bedside commode.  Patient is willing to try this.  Underlying aggressive wound care, every 2 hours nursing assessments.    Remove central line, place peripheral IV access.    Believe patient's cortrack should be able to be removed today, SLP to evaluate.  Initiate diet per SLP, advance per SLP.    Otherwise patient does not have much complaints, she is anxious regarding placement of further IV access.  She is been counseled and educated regarding importance of central line removal.    A. fib RVR, management initiated    Gentle diuresis    Omeprazole/sucralfate to continue    Aggressive pulmonary toilet, high risk of developing mucus  plugging, recurrent hypoxemia/need for intubation, pneumonia.  Poor cough, debility post intubation.  Discussed with nursing.  Respiratory therapy, changing orders given this have discontinued the respiratory protocol at this time.  If any questions/concerns, RT can reach out to the hospitalist for ongoing issues.    We will de-escalate RT therapy tomorrow if doing well    Initiate therapy evaluations    Continue close monitoring on telemetry    Consultants/Specialty  Pulmonology/critical care    Code Status  Full code    Disposition  Continue telemetry monitoring, PT/OT/SLP evaluations.  Anticipate postacute placement to skilled nursing facility, orders placed  Discussed with /case management on rounds     Review of Systems  Review of Systems   Constitutional: Positive for malaise/fatigue. Negative for chills and fever.   HENT: Negative for hearing loss and tinnitus.    Eyes: Negative for blurred vision and double vision.   Respiratory: Positive for cough, sputum production and shortness of breath. Negative for hemoptysis and wheezing.    Cardiovascular: Positive for leg swelling. Negative for chest pain, palpitations and PND.   Gastrointestinal: Positive for diarrhea. Negative for abdominal pain, blood in stool, constipation, heartburn, melena, nausea and vomiting.        FMS in place   Genitourinary:        Mora in place   Musculoskeletal: Negative for back pain, falls, joint pain, myalgias and neck pain.   Skin: Negative for rash.   Neurological: Positive for weakness. Negative for dizziness and headaches.   Psychiatric/Behavioral: Positive for substance abuse (ETOH Abuse). Negative for depression and suicidal ideas. The patient is nervous/anxious.         Physical Exam  Temp:  [36.9 °C (98.4 °F)-37.4 °C (99.4 °F)] 37.4 °C (99.4 °F)  Pulse:  [] 136  Resp:  [17-35] 18  BP: (104-155)/() 107/76  SpO2:  [93 %-97 %] 93 %    Physical Exam  HENT:      Head: Normocephalic and atraumatic.       Right Ear: External ear normal.      Left Ear: External ear normal.      Nose: Nose normal.      Mouth/Throat:      Mouth: Mucous membranes are moist.   Eyes:      Extraocular Movements: Extraocular movements intact.      Conjunctiva/sclera: Conjunctivae normal.      Pupils: Pupils are equal, round, and reactive to light.   Neck:      Musculoskeletal: Normal range of motion.      Comments: Right IJ in place.  Cardiovascular:      Rate and Rhythm: Tachycardia present. Rhythm irregular.      Pulses: Normal pulses.      Heart sounds: No murmur. No friction rub. No gallop.    Pulmonary:      Effort: Pulmonary effort is normal. No respiratory distress.      Breath sounds: No stridor. Rhonchi present. No wheezing or rales.      Comments: Patient has extensive rhonchi, poor respiratory clearance/poor cough.  Post intubation, consistent with respiratory secretions.  Diminished in bases.  Chest:      Chest wall: No tenderness.   Abdominal:      General: Abdomen is flat. Bowel sounds are normal. There is no distension.      Palpations: Abdomen is soft. There is no mass.      Tenderness: There is no tenderness. There is no right CVA tenderness, left CVA tenderness, guarding or rebound.      Hernia: No hernia is present.      Comments: Fecal management system in place   Genitourinary:     Comments: Mora in place, draining yellow urine.  No purulence in the urine.  No hematuria.  Musculoskeletal: Normal range of motion.      Right lower leg: Edema present.      Left lower leg: Edema present.   Skin:     General: Skin is warm and dry.      Capillary Refill: Capillary refill takes less than 2 seconds.   Neurological:      General: No focal deficit present.      Mental Status: She is alert and oriented to person, place, and time. Mental status is at baseline.      Cranial Nerves: No cranial nerve deficit.      Sensory: No sensory deficit.      Motor: No weakness.      Coordination: Coordination normal.      Gait: Gait normal.       Deep Tendon Reflexes: Reflexes normal.   Psychiatric:         Mood and Affect: Mood normal.         Behavior: Behavior normal.         Thought Content: Thought content normal.         Judgment: Judgment normal.         Fluids    Intake/Output Summary (Last 24 hours) at 2/6/2020 1440  Last data filed at 2/6/2020 1100  Gross per 24 hour   Intake 363.71 ml   Output 1581 ml   Net -1217.29 ml       Laboratory  Recent Labs     02/04/20  0523 02/05/20  0531 02/06/20  0339   WBC 8.5 10.2 10.1   RBC 3.80* 3.22* 3.28*   HEMOGLOBIN 12.5 10.6* 10.7*   HEMATOCRIT 39.2 33.8* 34.1*   .2* 105.0* 104.0*   MCH 32.9 32.9 32.6   MCHC 31.9* 31.4* 31.4*   RDW 55.1* 58.4* 57.0*   PLATELETCT 180 204 273   MPV 10.1 9.9 10.0     Recent Labs     02/04/20  0523 02/05/20  0531 02/06/20  0339   SODIUM 138 137 137   POTASSIUM 4.3 3.6 3.7   CHLORIDE 105 106 106   CO2 25 26 25   GLUCOSE 136* 99 113*   BUN 11 14 11   CREATININE 0.44* 0.43* 0.43*   CALCIUM 8.8 8.1* 7.9*             Recent Labs     02/05/20  0531   TRIGLYCERIDE 113       Imaging  DX-ABDOMEN FOR TUBE PLACEMENT   Final Result      Feeding tube tip in the gastric body.      DX-CHEST-PORTABLE (1 VIEW)   Final Result      1.  Removal of endotracheal tube.   2.  Stable interstitial edema and probable trace pleural effusions.      DX-CHEST-PORTABLE (1 VIEW)   Final Result         1. Stable lines and tubes.   2. Interstitial edema has improved.   3. Bibasilar atelectasis. No significant pleural effusions.         DX-CHEST-PORTABLE (1 VIEW)   Final Result         1.  Pulmonary edema and/or infiltrates are identified, which are stable since the prior exam.   2.  Trace pleural effusions, stable      DX-CHEST-PORTABLE (1 VIEW)   Final Result         1.  Pulmonary edema and/or infiltrates are identified, which are stable since the prior exam.   2.  Trace pleural effusions      DX-CHEST-PORTABLE (1 VIEW)   Final Result      Stable chest. Persistent bibasilar atelectasis or pneumonia, minor  diffuse interstitial edema, and small bilateral pleural effusions.      DX-CHEST-PORTABLE (1 VIEW)   Final Result      1.  Increased bibasilar atelectasis which could obscure an additional process   2.  Probable small BILATERAL pleural effusions   3.  Persistently enlarged cardiac silhouette      DX-ABDOMEN FOR TUBE PLACEMENT   Final Result      Enteric tube tip projects over the stomach.      DX-CHEST-PORTABLE (1 VIEW)   Final Result      1.  Supportive tubing as described above.   2.  Mild bibasilar infiltrate or atelectasis.   3.  No pneumothorax.      DX-ANKLE 3+ VIEWS RIGHT   Final Result      No evidence of fracture or dislocation.           Assessment/Plan  * Acute respiratory failure with hypoxemia (HCC)- (present on admission)  Assessment & Plan  Status post mechanical ventilation, improved    Poor cough, weak and debilitated    Initiate ipratropium nebulization with Mucomyst/3% NS to help expectorate/clear respiratory secretions to avoid mucus plugging, worsening hypoxemia/development of pneumonia.  Will avoid albuterol given underlying RVR.    RT protocol in place    PAF (paroxysmal atrial fibrillation) (HCC)- (present on admission)  Assessment & Plan  Patient in atrial fibrillation with rapid ventricular rate upon evaluation today    Not on anticoagulation, per previous outpatient cardiology notes supposed to be on Eliquis    Given that she has been not anticoagulated, would not recommend rhythm control for now, previous echocardiogram obtained in the outpatient setting reviewed by me    Would recommend aggressive rate control strategy  Metoprolol 25 mg every 8 hour  Blood pressures are on the lower side, will prefer digoxin compared to calcium channel blockers at this time  Initiate digoxin loading, 500 mcg IV now, followed by 250 mcg IV in 6 hours, 250 mcg dose orally in the morning tomorrow  Subsequently digoxin 125 mcg thereafter, will need digoxin level monitoring    For now initiate anticoagulation  with Lovenox, bridge to a therapeutic INR with Coumadin    We will prefer Coumadin in the acute setting, given if any risks of bleeding easily reversible    Patient with recent critical illness, high risk of peptic ulcer disease/stress ulcer/alcoholic gastritis.  Continue omeprazole/sucralfate.  Monitor for any concerns of bleeding.    If no acute issues, would recommend outpatient cardiology follow-up    Continue close monitoring on telemetry    Monitor and replace electrolytes as clinically appropriate    Debility- (present on admission)  Assessment & Plan  Status post critical illness  Required mechanical ventilation this hospitalization  Initiate physical therapy, Occupational Therapy, speech therapy  Patient has underlying dysphagia from recent mechanical ventilation/critical illness  Continue enteric feeding, advance diet per SLP recommendations  Aspiration, seizure, fall precautions in place  Anticipate patient will need postacute placement, SNF orders placed  Discussed with case management/social workers regarding disposition planning    Alcohol dependence (HCC)- (present on admission)  Assessment & Plan  Hospitalization complicated by alcohol withdrawal  Status post ICU stay, mechanical ventilation  Patient counseled and educated regarding alcohol cessation  Will optimize nutrition  We will monitor electrolytes, replace as clinically appropriate  Clinically improved at this time  We will continue folic acid, thiamine, multivitamins    Macrocytic anemia- (present on admission)  Assessment & Plan  We will check B12, folate, reticulocyte count, TSH, iron studies  No evidence of acute bleeding    Patient initiated on anticoagulation for underlying atrial fibrillation, recent critical illness/history of alcohol abuse with risk factors for peptic ulcer disease/alcoholic gastritis, given this patient will be maintained on omeprazole/sucralfate.    Monitor Hb / Restrictive transfusion strategy    Current moderate  episode of major depressive disorder without prior episode (HCC)- (present on admission)  Assessment & Plan  Continue sertraline, outpatient psychiatric follow-up       VTE prophylaxis: Lovenox/Coumadin        LR

## 2022-04-08 NOTE — OB RN DELIVERY SUMMARY - NSSELHIDDEN_OBGYN_ALL_OB_FT
[[email or web address]_OB_FT:[id number]],[NS_DeliveryAssist1_OBGYN_ALL_OB_FT:[id number]],[NS_DeliveryRN_OBGYN_ALL_OB_FT:[id number]]

## 2022-04-09 LAB
BASOPHILS # BLD AUTO: 0.01 K/UL — SIGNIFICANT CHANGE UP (ref 0–0.2)
BASOPHILS NFR BLD AUTO: 0.1 % — SIGNIFICANT CHANGE UP (ref 0–2)
EOSINOPHIL # BLD AUTO: 0.05 K/UL — SIGNIFICANT CHANGE UP (ref 0–0.5)
EOSINOPHIL NFR BLD AUTO: 0.5 % — SIGNIFICANT CHANGE UP (ref 0–6)
GLUCOSE BLDC GLUCOMTR-MCNC: 79 MG/DL — SIGNIFICANT CHANGE UP (ref 70–99)
HCT VFR BLD CALC: 29.7 % — LOW (ref 34.5–45)
HGB BLD-MCNC: 9.6 G/DL — LOW (ref 11.5–15.5)
IANC: 6.65 K/UL — SIGNIFICANT CHANGE UP (ref 1.8–7.4)
IMM GRANULOCYTES NFR BLD AUTO: 0.5 % — SIGNIFICANT CHANGE UP (ref 0–1.5)
LYMPHOCYTES # BLD AUTO: 2.64 K/UL — SIGNIFICANT CHANGE UP (ref 1–3.3)
LYMPHOCYTES # BLD AUTO: 26.3 % — SIGNIFICANT CHANGE UP (ref 13–44)
MCHC RBC-ENTMCNC: 26.9 PG — LOW (ref 27–34)
MCHC RBC-ENTMCNC: 32.3 GM/DL — SIGNIFICANT CHANGE UP (ref 32–36)
MCV RBC AUTO: 83.2 FL — SIGNIFICANT CHANGE UP (ref 80–100)
MONOCYTES # BLD AUTO: 0.63 K/UL — SIGNIFICANT CHANGE UP (ref 0–0.9)
MONOCYTES NFR BLD AUTO: 6.3 % — SIGNIFICANT CHANGE UP (ref 2–14)
NEUTROPHILS # BLD AUTO: 6.65 K/UL — SIGNIFICANT CHANGE UP (ref 1.8–7.4)
NEUTROPHILS NFR BLD AUTO: 66.3 % — SIGNIFICANT CHANGE UP (ref 43–77)
NRBC # BLD: 0 /100 WBCS — SIGNIFICANT CHANGE UP
NRBC # FLD: 0 K/UL — SIGNIFICANT CHANGE UP
PLATELET # BLD AUTO: 249 K/UL — SIGNIFICANT CHANGE UP (ref 150–400)
RBC # BLD: 3.57 M/UL — LOW (ref 3.8–5.2)
RBC # FLD: 14.3 % — SIGNIFICANT CHANGE UP (ref 10.3–14.5)
WBC # BLD: 10.03 K/UL — SIGNIFICANT CHANGE UP (ref 3.8–10.5)
WBC # FLD AUTO: 10.03 K/UL — SIGNIFICANT CHANGE UP (ref 3.8–10.5)

## 2022-04-09 RX ORDER — IBUPROFEN 200 MG
600 TABLET ORAL EVERY 6 HOURS
Refills: 0 | Status: DISCONTINUED | OUTPATIENT
Start: 2022-04-09 | End: 2022-04-10

## 2022-04-09 RX ADMIN — Medication 30 MILLIGRAM(S): at 00:15

## 2022-04-09 RX ADMIN — HEPARIN SODIUM 10000 UNIT(S): 5000 INJECTION INTRAVENOUS; SUBCUTANEOUS at 06:37

## 2022-04-09 RX ADMIN — Medication 975 MILLIGRAM(S): at 02:39

## 2022-04-09 RX ADMIN — SIMETHICONE 80 MILLIGRAM(S): 80 TABLET, CHEWABLE ORAL at 12:10

## 2022-04-09 RX ADMIN — Medication 112 MICROGRAM(S): at 06:40

## 2022-04-09 RX ADMIN — Medication 600 MILLIGRAM(S): at 17:32

## 2022-04-09 RX ADMIN — Medication 975 MILLIGRAM(S): at 03:30

## 2022-04-09 RX ADMIN — Medication 975 MILLIGRAM(S): at 21:51

## 2022-04-09 RX ADMIN — Medication 975 MILLIGRAM(S): at 09:00

## 2022-04-09 RX ADMIN — Medication 30 MILLIGRAM(S): at 06:52

## 2022-04-09 RX ADMIN — Medication 600 MILLIGRAM(S): at 12:40

## 2022-04-09 RX ADMIN — Medication 600 MILLIGRAM(S): at 18:09

## 2022-04-09 RX ADMIN — Medication 975 MILLIGRAM(S): at 08:29

## 2022-04-09 RX ADMIN — SIMETHICONE 80 MILLIGRAM(S): 80 TABLET, CHEWABLE ORAL at 23:55

## 2022-04-09 RX ADMIN — Medication 975 MILLIGRAM(S): at 22:21

## 2022-04-09 RX ADMIN — MAGNESIUM HYDROXIDE 30 MILLILITER(S): 400 TABLET, CHEWABLE ORAL at 17:31

## 2022-04-09 RX ADMIN — MAGNESIUM HYDROXIDE 30 MILLILITER(S): 400 TABLET, CHEWABLE ORAL at 22:44

## 2022-04-09 RX ADMIN — HEPARIN SODIUM 10000 UNIT(S): 5000 INJECTION INTRAVENOUS; SUBCUTANEOUS at 17:32

## 2022-04-09 RX ADMIN — Medication 975 MILLIGRAM(S): at 15:00

## 2022-04-09 RX ADMIN — Medication 975 MILLIGRAM(S): at 15:30

## 2022-04-09 RX ADMIN — Medication 600 MILLIGRAM(S): at 12:11

## 2022-04-09 RX ADMIN — Medication 30 MILLIGRAM(S): at 06:37

## 2022-04-09 RX ADMIN — Medication 600 MILLIGRAM(S): at 23:55

## 2022-04-09 NOTE — PROGRESS NOTE ADULT - ASSESSMENT
A/P: 37yo POD#1 s/p rLTCS.  Patient is stable and doing well post-operatively.    - Continue regular diet.  - Increase ambulation.  - Continue motrin, tylenol, oxycodone PRN for pain control  - POD 1 H/H appropriate for EBL   - f/u passage of flatus   - 24 hour finger sticks for GDMA2    Sammi Vu MD PGY1

## 2022-04-10 VITALS
HEART RATE: 86 BPM | TEMPERATURE: 98 F | RESPIRATION RATE: 18 BRPM | OXYGEN SATURATION: 100 % | SYSTOLIC BLOOD PRESSURE: 124 MMHG | DIASTOLIC BLOOD PRESSURE: 70 MMHG

## 2022-04-10 LAB — GLUCOSE BLDC GLUCOMTR-MCNC: 79 MG/DL — SIGNIFICANT CHANGE UP (ref 70–99)

## 2022-04-10 RX ORDER — SENNA PLUS 8.6 MG/1
1 TABLET ORAL
Refills: 0 | Status: DISCONTINUED | OUTPATIENT
Start: 2022-04-10 | End: 2022-04-10

## 2022-04-10 RX ADMIN — Medication 975 MILLIGRAM(S): at 09:48

## 2022-04-10 RX ADMIN — Medication 112 MICROGRAM(S): at 05:58

## 2022-04-10 RX ADMIN — Medication 600 MILLIGRAM(S): at 13:52

## 2022-04-10 RX ADMIN — Medication 975 MILLIGRAM(S): at 02:34

## 2022-04-10 RX ADMIN — Medication 600 MILLIGRAM(S): at 12:52

## 2022-04-10 RX ADMIN — Medication 600 MILLIGRAM(S): at 00:25

## 2022-04-10 RX ADMIN — Medication 975 MILLIGRAM(S): at 15:27

## 2022-04-10 RX ADMIN — SIMETHICONE 80 MILLIGRAM(S): 80 TABLET, CHEWABLE ORAL at 08:50

## 2022-04-10 RX ADMIN — Medication 975 MILLIGRAM(S): at 02:04

## 2022-04-10 RX ADMIN — Medication 600 MILLIGRAM(S): at 06:29

## 2022-04-10 RX ADMIN — SENNA PLUS 1 TABLET(S): 8.6 TABLET ORAL at 10:10

## 2022-04-10 RX ADMIN — Medication 600 MILLIGRAM(S): at 05:59

## 2022-04-10 RX ADMIN — Medication 975 MILLIGRAM(S): at 08:49

## 2022-04-10 RX ADMIN — HEPARIN SODIUM 10000 UNIT(S): 5000 INJECTION INTRAVENOUS; SUBCUTANEOUS at 05:58

## 2022-04-10 NOTE — PROGRESS NOTE ADULT - SUBJECTIVE AND OBJECTIVE BOX
OB Progress Note:  Delivery, POD#1    S: 37yo POD#1 s/p rLTCS, QBL = 220. Her pain is well controlled. She is tolerating a regular diet. Denies N/V. Denies CP/SOB/lightheadedness/dizziness.   She is ambulating without difficulty.   Voiding spontaneously. Not yet passing flatus     O:   Vital Signs Last 24 Hrs  T(C): 36.4 (2022 06:16), Max: 36.7 (2022 08:42)  T(F): 97.6 (2022 06:16), Max: 98.1 (2022 08:42)  HR: 71 (2022 06:16) (62 - 101)  BP: 102/63 (2022 06:16) (87/44 - 124/74)  BP(mean): 70 (2022 16:30) (55 - 74)  RR: 18 (2022 06:16) (16 - 24)  SpO2: 100% (2022 06:16) (96% - 100%)    Labs:  Blood type: B Positive  Rubella IgG: RPR: Negative                          9.6<L>   10.03 >-----------< 249    (  @ 07:01 )             29.7<L>                        11.1<L>   9.56 >-----------< 308    (  @ 08:10 )             34.7                  PE:  General: NAD  Abdomen: Mildly distended, appropriately tender, incision c/d/i.  Extremities: No erythema, no pitting edema    
SUBJECTIVE:  Pain: well controlled  Complaints: none  MILESTONES:  Alert and oriented x 3  [ x ]  Out of bed/ ambulating. [ x ]  Flatus: [ x ]  Postive [  ] Negative   Bowel movement  [  ] Positive [ x ] Negative   Voiding [ x ] Due to void [  ]   Diet: Regular [ x ]  Clears [  ]  NPO [  ]  Infant feeding:  Breast [ x ]   Bottle [  ]  Both [  ]  Feeding related inssues and/or concerns: none    OBJECTIVE:  T(C): 36.4 (04-10-22 @ 06:00), Max: 36.8 (22 @ 17:41)  HR: 86 (04-10-22 @ 06:00) (83 - 90)  BP: 118/75 (04-10-22 @ 06:00) (92/59 - 120/64)  RR: 18 (04-10-22 @ 06:00) (18 - 18)  SpO2: 100% (04-10-22 @ 06:00) (100% - 100%)  Wt(kg): --                        9.6    10.03 )-----------( 249      ( 2022 07:01 )             29.7       MEDICATIONS  (STANDING):  acetaminophen     Tablet .. 975 milliGRAM(s) Oral <User Schedule>  diphtheria/tetanus/pertussis (acellular) Vaccine (ADAcel) 0.5 milliLiter(s) IntraMuscular once  heparin   Injectable 96925 Unit(s) SubCutaneous every 12 hours  ibuprofen  Tablet. 600 milliGRAM(s) Oral every 6 hours  levothyroxine 112 MICROGram(s) Oral daily  senna 1 Tablet(s) Oral two times a day    MEDICATIONS  (PRN):  diphenhydrAMINE 25 milliGRAM(s) Oral every 6 hours PRN Pruritus  lanolin Ointment 1 Application(s) Topical every 6 hours PRN Sore Nipples  magnesium hydroxide Suspension 30 milliLiter(s) Oral two times a day PRN Constipation  nalbuphine Injectable 2.5 milliGRAM(s) IV Push every 6 hours PRN Pruritus  naloxone Injectable 0.1 milliGRAM(s) IV Push every 3 minutes PRN For ANY of the following changes in patient status:  A. Breaths Per Minute LESS THAN 10, B. Oxygen saturation LESS THAN 90%, C. Sedation score of 6 for Stop After: 4 Times  oxyCODONE    IR 5 milliGRAM(s) Oral every 3 hours PRN Moderate to Severe Pain (4-10)  oxyCODONE    IR 5 milliGRAM(s) Oral once PRN Moderate to Severe Pain (4-10)  simethicone 80 milliGRAM(s) Chew every 4 hours PRN Gas      ASSESSMENT:  36y  y/o G  2 P 2   PO Day#  2      Delivery: Primary [  ]    Repeat [ x ]                                       Indication of procedure:  Condition: Stable  Past Medical History significant for: HPI:GDMA2  37 yo , EGA@39.1 weeks presented for scheduled repeat  section. Patient denies contractions/leaking fluid/vaginal bleeding,  reports + fetal movements  (2022 07:51)    Current Issues: none  Heart:          RRR                    Lungs: clear  Breasts:  Soft [x  ]   Engorged [  ]  Abdomen: Soft [ x ] , distended [  ] nontender [x  ]   Bowel sounds :  Present [x  ]  Absent [  ]   Fundus firm [x  ]  Boggy [  ]  Abdominal incision: Clean, dry and intact [x  ]  Staples [  ] Steri Strips [  ] Dermabond [  ] Sutures [x  ] STEVEN ( )  Patient wearing abdominal binder for support.  Vaginal: Lochia:  Heavy [  ]  Moderate [  ]   Scant [x  ]  Extremities: Edema [0 ] negative Jay's Sign [x  ] Nontender Gerardo  [x  ] Positive pedal pulses [ x ]  Other relevant physical exam findings:none      PLAN:  Plan: Increase ambulation, analgesia PRN and pain medication protocol standing oxycodone, ibuprofen and acetaminophen.  Diet: Regular diet  2hr GTT 6wks PP  Continue routine post-operative and postpartum care.   Discharge Planning [ x ]  Consults:   Social Work [  ] Lac[place] [  ] Other [  ]

## 2022-04-29 ENCOUNTER — EMERGENCY (EMERGENCY)
Facility: HOSPITAL | Age: 37
LOS: 1 days | Discharge: HOME | End: 2022-04-29
Attending: STUDENT IN AN ORGANIZED HEALTH CARE EDUCATION/TRAINING PROGRAM | Admitting: STUDENT IN AN ORGANIZED HEALTH CARE EDUCATION/TRAINING PROGRAM
Payer: COMMERCIAL

## 2022-04-29 VITALS
HEART RATE: 96 BPM | DIASTOLIC BLOOD PRESSURE: 71 MMHG | HEIGHT: 64 IN | RESPIRATION RATE: 18 BRPM | SYSTOLIC BLOOD PRESSURE: 122 MMHG | OXYGEN SATURATION: 100 % | TEMPERATURE: 98 F

## 2022-04-29 DIAGNOSIS — Z98.890 OTHER SPECIFIED POSTPROCEDURAL STATES: Chronic | ICD-10-CM

## 2022-04-29 DIAGNOSIS — K08.409 PARTIAL LOSS OF TEETH, UNSPECIFIED CAUSE, UNSPECIFIED CLASS: Chronic | ICD-10-CM

## 2022-04-29 DIAGNOSIS — Z87.2 PERSONAL HISTORY OF DISEASES OF THE SKIN AND SUBCUTANEOUS TISSUE: Chronic | ICD-10-CM

## 2022-04-29 PROCEDURE — 99284 EMERGENCY DEPT VISIT MOD MDM: CPT

## 2022-04-29 NOTE — ED PROVIDER NOTE - PATIENT PORTAL LINK FT
You can access the FollowMyHealth Patient Portal offered by [facility] by registering at the following website: [email or web address]. By joining [employer]’s FollowMyHealth portal, you will also be able to view your health information using other applications (apps) compatible with our system.

## 2022-04-29 NOTE — ED PROVIDER NOTE - OBJECTIVE STATEMENT
37 y/o F  (both C-sections) and gestational hyperglycemia otherwise no PMHx including no DVT/PE presents to the ED w/ L leg swelling and tingling. Pt was told by her GYN to come to the ED to r/o DVT. Pt also mentioned she has L sided mild headache and tingling sensation on her left upper and lower extremities. Denies fevers, chills, change in urination, denies any drug use, tobacco, or ETOH use.

## 2022-04-29 NOTE — ED ADULT TRIAGE NOTE - CHIEF COMPLAINT QUOTE
L leg pain, behind the knee, radiating to groin & down L arm started yesterday - 3 weeks post partum s/p C section - no HX of blood clots, blood disorders - no swelling or injury to area

## 2022-04-29 NOTE — ED PROVIDER NOTE - CLINICAL SUMMARY MEDICAL DECISION MAKING FREE TEXT BOX
37 y/o F presents to the ED w/ mL leg swelling. Pt denies high blood pressure. Pt w/ minimal symptoms. Less likely post partum preeclampsia. Will obtain ultrasound to r/o bilateral DVT and d/c with GYN f/u.

## 2022-04-29 NOTE — ED PROVIDER NOTE - NSICDXPASTSURGICALHX_GEN_ALL_CORE_FT
PAST SURGICAL HISTORY:   delivery delivered 2019. delivered at 38 weeks gestation, baby boy weighing 7 pounds, 12 ounces    H/O pilonidal cyst     S/P eye surgery     Agar teeth removed

## 2022-04-30 PROCEDURE — 93970 EXTREMITY STUDY: CPT | Mod: 26

## 2022-05-01 NOTE — DISCHARGE NOTE OB - CARE PLAN
Patient's stool for GI pathogen panel came back positive for yersinia enterocolitica and rotavirus infection.  Blood culture on admission 4/29/2022 so far no growth and patient has no abdominal pain, feverish feeling or chills but still having profuse watery diarrhea.  Discussed with ID who recommended to order stool culture and then start her on IV ciprofloxacin for now and then ID will see on consult for further management.   Principal Discharge DX:	 delivery delivered  Assessment and plan of treatment:	nothing in the vagina x 6 weeks   no heavy lifting > 10 pounds  x 6 weeks   1

## 2022-06-01 ENCOUNTER — NON-APPOINTMENT (OUTPATIENT)
Age: 37
End: 2022-06-01

## 2022-06-06 ENCOUNTER — NON-APPOINTMENT (OUTPATIENT)
Age: 37
End: 2022-06-06

## 2022-06-28 ENCOUNTER — APPOINTMENT (OUTPATIENT)
Dept: MATERNAL FETAL MEDICINE | Facility: CLINIC | Age: 37
End: 2022-06-28

## 2022-08-29 ENCOUNTER — RESULT REVIEW (OUTPATIENT)
Age: 37
End: 2022-08-29

## 2023-05-05 NOTE — OB RN PATIENT PROFILE - FUNCTIONAL ASSESSMENT - BASIC MOBILITY SCORE HIDDEN
I contacted Philip's mother and informed her of the negative/normal results of Philip's skeletal dysplasia panel.       The family elected to proceed with whole exome sequencing as a next step. We will arrange a virtual consent appointment. Philip's mother verbalized understanding and had no additional questions or concerns.    24

## 2024-01-17 ENCOUNTER — EMERGENCY (EMERGENCY)
Facility: HOSPITAL | Age: 39
LOS: 1 days | Discharge: HOME | End: 2024-01-17
Attending: STUDENT IN AN ORGANIZED HEALTH CARE EDUCATION/TRAINING PROGRAM
Payer: COMMERCIAL

## 2024-01-17 VITALS
HEART RATE: 87 BPM | WEIGHT: 229.94 LBS | DIASTOLIC BLOOD PRESSURE: 70 MMHG | HEIGHT: 64 IN | TEMPERATURE: 98 F | RESPIRATION RATE: 15 BRPM | OXYGEN SATURATION: 100 % | SYSTOLIC BLOOD PRESSURE: 99 MMHG

## 2024-01-17 VITALS
OXYGEN SATURATION: 98 % | HEART RATE: 73 BPM | DIASTOLIC BLOOD PRESSURE: 79 MMHG | TEMPERATURE: 98 F | SYSTOLIC BLOOD PRESSURE: 112 MMHG | RESPIRATION RATE: 16 BRPM

## 2024-01-17 DIAGNOSIS — Z98.891 HISTORY OF UTERINE SCAR FROM PREVIOUS SURGERY: Chronic | ICD-10-CM

## 2024-01-17 DIAGNOSIS — Z87.2 PERSONAL HISTORY OF DISEASES OF THE SKIN AND SUBCUTANEOUS TISSUE: Chronic | ICD-10-CM

## 2024-01-17 DIAGNOSIS — K08.409 PARTIAL LOSS OF TEETH, UNSPECIFIED CAUSE, UNSPECIFIED CLASS: Chronic | ICD-10-CM

## 2024-01-17 DIAGNOSIS — Z98.890 OTHER SPECIFIED POSTPROCEDURAL STATES: Chronic | ICD-10-CM

## 2024-01-17 LAB
ALBUMIN SERPL ELPH-MCNC: 4.1 G/DL — SIGNIFICANT CHANGE UP (ref 3.3–5)
ALP SERPL-CCNC: 67 U/L — SIGNIFICANT CHANGE UP (ref 40–120)
ALT FLD-CCNC: 14 U/L — SIGNIFICANT CHANGE UP (ref 10–45)
ANION GAP SERPL CALC-SCNC: 11 MMOL/L — SIGNIFICANT CHANGE UP (ref 5–17)
AST SERPL-CCNC: 30 U/L — SIGNIFICANT CHANGE UP (ref 10–40)
BASOPHILS # BLD AUTO: 0.03 K/UL — SIGNIFICANT CHANGE UP (ref 0–0.2)
BASOPHILS NFR BLD AUTO: 0.4 % — SIGNIFICANT CHANGE UP (ref 0–2)
BILIRUB SERPL-MCNC: 0.2 MG/DL — SIGNIFICANT CHANGE UP (ref 0.2–1.2)
BUN SERPL-MCNC: 19 MG/DL — SIGNIFICANT CHANGE UP (ref 7–23)
CALCIUM SERPL-MCNC: 9.4 MG/DL — SIGNIFICANT CHANGE UP (ref 8.4–10.5)
CHLORIDE SERPL-SCNC: 101 MMOL/L — SIGNIFICANT CHANGE UP (ref 96–108)
CO2 SERPL-SCNC: 24 MMOL/L — SIGNIFICANT CHANGE UP (ref 22–31)
CREAT SERPL-MCNC: 0.74 MG/DL — SIGNIFICANT CHANGE UP (ref 0.5–1.3)
EGFR: 107 ML/MIN/1.73M2 — SIGNIFICANT CHANGE UP
EOSINOPHIL # BLD AUTO: 0.1 K/UL — SIGNIFICANT CHANGE UP (ref 0–0.5)
EOSINOPHIL NFR BLD AUTO: 1.4 % — SIGNIFICANT CHANGE UP (ref 0–6)
GLUCOSE SERPL-MCNC: 89 MG/DL — SIGNIFICANT CHANGE UP (ref 70–99)
HCG SERPL-ACNC: <2 MIU/ML — SIGNIFICANT CHANGE UP
HCT VFR BLD CALC: 38.6 % — SIGNIFICANT CHANGE UP (ref 34.5–45)
HGB BLD-MCNC: 12.6 G/DL — SIGNIFICANT CHANGE UP (ref 11.5–15.5)
IMM GRANULOCYTES NFR BLD AUTO: 0.1 % — SIGNIFICANT CHANGE UP (ref 0–0.9)
LYMPHOCYTES # BLD AUTO: 2.66 K/UL — SIGNIFICANT CHANGE UP (ref 1–3.3)
LYMPHOCYTES # BLD AUTO: 36.4 % — SIGNIFICANT CHANGE UP (ref 13–44)
MAGNESIUM SERPL-MCNC: 2.2 MG/DL — SIGNIFICANT CHANGE UP (ref 1.6–2.6)
MCHC RBC-ENTMCNC: 27.6 PG — SIGNIFICANT CHANGE UP (ref 27–34)
MCHC RBC-ENTMCNC: 32.6 GM/DL — SIGNIFICANT CHANGE UP (ref 32–36)
MCV RBC AUTO: 84.5 FL — SIGNIFICANT CHANGE UP (ref 80–100)
MONOCYTES # BLD AUTO: 0.42 K/UL — SIGNIFICANT CHANGE UP (ref 0–0.9)
MONOCYTES NFR BLD AUTO: 5.7 % — SIGNIFICANT CHANGE UP (ref 2–14)
NEUTROPHILS # BLD AUTO: 4.09 K/UL — SIGNIFICANT CHANGE UP (ref 1.8–7.4)
NEUTROPHILS NFR BLD AUTO: 56 % — SIGNIFICANT CHANGE UP (ref 43–77)
NRBC # BLD: 0 /100 WBCS — SIGNIFICANT CHANGE UP (ref 0–0)
PLATELET # BLD AUTO: 284 K/UL — SIGNIFICANT CHANGE UP (ref 150–400)
POTASSIUM SERPL-MCNC: 4.7 MMOL/L — SIGNIFICANT CHANGE UP (ref 3.5–5.3)
POTASSIUM SERPL-SCNC: 4.7 MMOL/L — SIGNIFICANT CHANGE UP (ref 3.5–5.3)
PROT SERPL-MCNC: 7.8 G/DL — SIGNIFICANT CHANGE UP (ref 6–8.3)
RBC # BLD: 4.57 M/UL — SIGNIFICANT CHANGE UP (ref 3.8–5.2)
RBC # FLD: 13.5 % — SIGNIFICANT CHANGE UP (ref 10.3–14.5)
SODIUM SERPL-SCNC: 136 MMOL/L — SIGNIFICANT CHANGE UP (ref 135–145)
TROPONIN T, HIGH SENSITIVITY RESULT: <6 NG/L — SIGNIFICANT CHANGE UP (ref 0–51)
WBC # BLD: 7.31 K/UL — SIGNIFICANT CHANGE UP (ref 3.8–10.5)
WBC # FLD AUTO: 7.31 K/UL — SIGNIFICANT CHANGE UP (ref 3.8–10.5)

## 2024-01-17 PROCEDURE — 36415 COLL VENOUS BLD VENIPUNCTURE: CPT

## 2024-01-17 PROCEDURE — 71045 X-RAY EXAM CHEST 1 VIEW: CPT

## 2024-01-17 PROCEDURE — 99285 EMERGENCY DEPT VISIT HI MDM: CPT

## 2024-01-17 PROCEDURE — 80053 COMPREHEN METABOLIC PANEL: CPT

## 2024-01-17 PROCEDURE — 70450 CT HEAD/BRAIN W/O DYE: CPT | Mod: 26,MD

## 2024-01-17 PROCEDURE — 71045 X-RAY EXAM CHEST 1 VIEW: CPT | Mod: 26

## 2024-01-17 PROCEDURE — 84702 CHORIONIC GONADOTROPIN TEST: CPT

## 2024-01-17 PROCEDURE — 84484 ASSAY OF TROPONIN QUANT: CPT

## 2024-01-17 PROCEDURE — 93005 ELECTROCARDIOGRAM TRACING: CPT

## 2024-01-17 PROCEDURE — 99285 EMERGENCY DEPT VISIT HI MDM: CPT | Mod: 25

## 2024-01-17 PROCEDURE — 85025 COMPLETE CBC W/AUTO DIFF WBC: CPT

## 2024-01-17 PROCEDURE — 70450 CT HEAD/BRAIN W/O DYE: CPT | Mod: MD

## 2024-01-17 PROCEDURE — 83735 ASSAY OF MAGNESIUM: CPT

## 2024-01-17 RX ORDER — SODIUM CHLORIDE 9 MG/ML
1000 INJECTION, SOLUTION INTRAVENOUS ONCE
Refills: 0 | Status: COMPLETED | OUTPATIENT
Start: 2024-01-17 | End: 2024-01-17

## 2024-01-17 RX ADMIN — SODIUM CHLORIDE 1000 MILLILITER(S): 9 INJECTION, SOLUTION INTRAVENOUS at 21:46

## 2024-01-17 NOTE — ED PROVIDER NOTE - NSFOLLOWUPINSTRUCTIONS_ED_ALL_ED_FT
You came to the ER today because you had a syncopal episode (passing out) or a near syncopal episode (almost passing out). We did an EKG and didn’t see any large abnormality that necessitated that you stay in the hospital for further evaluation however, it is very important that you follow up with your primary care doctor within the next 5 days. Additionally, if you pass out or feel like you will pass out, if you develop chest pain, abdominal pain, nausea, vomiting, dark stools, bloody stools, shortness of breath, palpitations please make sure to seek IMMEDIATE MEDICAL ATTENTION as this could be a sign of something more serious, like a heart attack, valve disease, intraabdominal bleeding, gastro intestinal bleeding, or stroke. We also have given you information to follow up with cardiology and they may recommend an echo or a loop recorder to be placed to monitor your heart. Please make sure to follow up with them in the next 5-7 days as well. Please follow up with your neurologist in the next 1-2 weeks.   if you develop arm/leg weakness or numbness, slurred speech, trouble walking or severe headache return to the hospital.

## 2024-01-17 NOTE — ED PROVIDER NOTE - OBJECTIVE STATEMENT
38-year-old female who has history of hypothyroidism, Lyme disease and Bartonella following with a specialist in [place] sees Dr. Padilla Crandall neurology prior surgical history of pilonidal cyst excision Lasix  x 2 presents to the emergency department with intermittent dizziness while standing for 3 days as well as fatigue and bilateral arm tingling sensation.  Patient states is worse when going from sitting to standing.  She states she has a history of vertigo however this seems different.  Patient denies any headaches but reports she feels an abnormal neurologic sensation on the left occipital aspect of her head.  She states that since being diagnosed with Bartonella she thinks that the abnormal neurologic sensation on the left side of her body is related to that.  Patient did take a COVID test this morning that was negative she went to see her primary care doctor who recommended she come to the emergency department for "a full workup" of her symptoms.  Patient denies any fevers, chills, chest pain, shortness of breath, headaches, vision changes, weakness or numbness in her arms or legs.  She reports she is compliant with her home medications including sertraline and levothyroxine and Atomoxetine

## 2024-01-17 NOTE — ED PROVIDER NOTE - PATIENT PORTAL LINK FT
You can access the FollowMyHealth Patient Portal offered by [facility] by registering at the following website: [email or web address]. By joining [employer]’s FollowMyHealth portal, you will also be able to view your health information using other applications (apps) compatible with our system.

## 2024-01-17 NOTE — ED PROVIDER NOTE - PROGRESS NOTE DETAILS
pt reports feeling improved ambulatory in the department understands return precautions to follow up w her neurologist. pt verbalized understanding,

## 2024-01-17 NOTE — ED ADULT NURSE NOTE - NSFALLUNIVINTERV_ED_ALL_ED
Bed/Stretcher in lowest position, wheels locked, appropriate side rails in place/Call bell, personal items and telephone in reach/Instruct patient to call for assistance before getting out of bed/chair/stretcher/Non-slip footwear applied when patient is off stretcher/[place] to call system/Physically safe environment - no spills, clutter or unnecessary equipment/Purposeful proactive rounding/Room/bathroom lighting operational, light cord in reach

## 2024-01-17 NOTE — ED ADULT NURSE NOTE - OBJECTIVE STATEMENT
39 y/o female came to the ED with complaints of dizziness when standing x 3 days, bilateral upper extremity tingling and fatigue. History of hypothyroidism, Lyme disease, Bartonella. Dizziness gets worse from sitting to standing. History of Vertigo but this does not feel the same. Denies CP, SOB, headache, weakness, numbness, vision changes.

## 2024-01-17 NOTE — ED PROVIDER NOTE - NSFOLLOWUPCLINICS_GEN_ALL_ED_FT
[facility] General Internal Medicine  General Internal Medicine  [address] [place] [address]  Phone: [phone]  Fax:

## 2024-01-17 NOTE — ED PROVIDER NOTE - PHYSICAL EXAMINATION
Const: appearing stated age, no acute distress  Head: atraumatic, normocephalic  Eyes: no conjunctival injection and no scleral icterus pupils are 7 mm and reactive   ENMT: Atraumatic external nose and ears, Moist mucus membranes  Neck: Symmetric, trachea midline, no c spine tenderness  BACK: no bruising, no midline t/l spine tenderness  CVS: +S1/S2, dorsalis pedis/radial pulse 2+ bilaterally  RESP: Unlabored respiratory effort, Clear to auscultation bilaterally  GI: Nontender/Nondistended, soft abdomen  MSK: Extremities w/o deformity or ttp   Skin: Warm, Dry w/ no rashes  Neuro: GCS=15, CNs II-XII intact, motor in all 4 extremities equal, Sensation intact in all 4 extremities   Psych: Awake, Alert, & Orientedx3;  Appropriate mood and affect, cooperative

## 2024-01-17 NOTE — ED ADULT TRIAGE NOTE - CHIEF COMPLAINT QUOTE
hx of vertigo dizziness 3 day sent by PMD  when changing positions laying down best pt endorsees shocks in her left side of her head  3 hour place Saturday  pt has Lyme disease
independent

## 2024-01-17 NOTE — ED PROVIDER NOTE - CLINICAL SUMMARY MEDICAL DECISION MAKING FREE TEXT BOX
pt sent here by pcp, for dizziness, worsening she has no cp, no sob, pt w/ abnl sensation on the L side of her head, findings c/f  possible mass, vs orthostatic changes electrolyte imbalance, plan for labs imaging and reassessment

## 2024-01-26 NOTE — OB PROVIDER H&P - BIRTH SEX
Pre op R tka      This is a 77 y.o. year old female who presents for preoperative visit for her right knee.  Patient has severe degenerative disease of the affected joint. The patient complains of severe pain in the area, the pain is gradually getting worse. She has failed extensive nonsurgical treatment including anti-inflammatories physical therapy use of assistive devices activity modification cortisone injections. Despite this interventions the patient is worsening pain which impacts her quality of life and activities of daily living and they would like to proceed with joint replacement.    Physical Exam    There has been no interval change in this patient's past medical, surgical, medications, allergies, family history or social history since the most recent visit to a provider within our department. 14 point review of systems was performed, reviewed, and negative except for pertinent positives documented in the history of present illness.     Constitutional: well developed, well nourished female in no acute distress  Psychiatric: normal mood, appropriate affect  Eyes: sclera anicteric  HENT: normocephalic/atraumatic  CV: regular rate and rhythm   Respiratory: non labored breathing  Integumentary: no rash  Neurological: moves all extremities    Pre-Admission Testing on 01/18/2024   Component Date Value Ref Range Status    WBC 01/18/2024 6.5  4.4 - 11.3 x10*3/uL Final    nRBC 01/18/2024 0.0  0.0 - 0.0 /100 WBCs Final    RBC 01/18/2024 5.02  4.00 - 5.20 x10*6/uL Final    Hemoglobin 01/18/2024 13.8  12.0 - 16.0 g/dL Final    Hematocrit 01/18/2024 43.7  36.0 - 46.0 % Final    MCV 01/18/2024 87  80 - 100 fL Final    MCH 01/18/2024 27.5  26.0 - 34.0 pg Final    MCHC 01/18/2024 31.6 (L)  32.0 - 36.0 g/dL Final    RDW 01/18/2024 13.3  11.5 - 14.5 % Final    Platelets 01/18/2024 269  150 - 450 x10*3/uL Final    Neutrophils % 01/18/2024 46.4  40.0 - 80.0 % Final    Immature Granulocytes %, Automated 01/18/2024 0.3  0.0 -  0.9 % Final    Immature Granulocyte Count (IG) includes promyelocytes, myelocytes and metamyelocytes but does not include bands. Percent differential counts (%) should be interpreted in the context of the absolute cell counts (cells/UL).    Lymphocytes % 01/18/2024 41.0  13.0 - 44.0 % Final    Monocytes % 01/18/2024 7.1  2.0 - 10.0 % Final    Eosinophils % 01/18/2024 4.0  0.0 - 6.0 % Final    Basophils % 01/18/2024 1.2  0.0 - 2.0 % Final    Neutrophils Absolute 01/18/2024 2.99  1.60 - 5.50 x10*3/uL Final    Percent differential counts (%) should be interpreted in the context of the absolute cell counts (cells/uL).    Immature Granulocytes Absolute, Au* 01/18/2024 0.02  0.00 - 0.50 x10*3/uL Final    Lymphocytes Absolute 01/18/2024 2.65  0.80 - 3.00 x10*3/uL Final    Monocytes Absolute 01/18/2024 0.46  0.05 - 0.80 x10*3/uL Final    Eosinophils Absolute 01/18/2024 0.26  0.00 - 0.40 x10*3/uL Final    Basophils Absolute 01/18/2024 0.08  0.00 - 0.10 x10*3/uL Final    Glucose 01/18/2024 117 (H)  74 - 99 mg/dL Final    Sodium 01/18/2024 140  136 - 145 mmol/L Final    Potassium 01/18/2024 4.1  3.5 - 5.3 mmol/L Final    Chloride 01/18/2024 101  98 - 107 mmol/L Final    Bicarbonate 01/18/2024 29  21 - 32 mmol/L Final    Anion Gap 01/18/2024 14  10 - 20 mmol/L Final    Urea Nitrogen 01/18/2024 20  6 - 23 mg/dL Final    Creatinine 01/18/2024 0.92  0.50 - 1.05 mg/dL Final    eGFR 01/18/2024 65  >60 mL/min/1.73m*2 Final    Calculations of estimated GFR are performed using the 2021 CKD-EPI Study Refit equation without the race variable for the IDMS-Traceable creatinine methods.  https://jasn.asnjournals.org/content/early/2021/09/22/ASN.4139430405    Calcium 01/18/2024 9.8  8.6 - 10.3 mg/dL Final    Albumin 01/18/2024 4.1  3.4 - 5.0 g/dL Final    Alkaline Phosphatase 01/18/2024 89  33 - 136 U/L Final    Total Protein 01/18/2024 6.3 (L)  6.4 - 8.2 g/dL Final    AST 01/18/2024 16  9 - 39 U/L Final    Bilirubin, Total 01/18/2024 0.4   0.0 - 1.2 mg/dL Final    ALT 01/18/2024 19  7 - 45 U/L Final    Patients treated with Sulfasalazine may generate falsely decreased results for ALT.    Staph/MRSA Screen Culture 01/18/2024 No Staphylococcus aureus isolated   Final    Color, Urine 01/18/2024 Yellow  Straw, Yellow Final    Appearance, Urine 01/18/2024 Hazy (N)  Clear Final    Specific Gravity, Urine 01/18/2024 1.009  1.005 - 1.035 Final    pH, Urine 01/18/2024 6.0  5.0, 5.5, 6.0, 6.5, 7.0, 7.5, 8.0 Final    Protein, Urine 01/18/2024 NEGATIVE  NEGATIVE mg/dL Final    Glucose, Urine 01/18/2024 NEGATIVE  NEGATIVE mg/dL Final    Blood, Urine 01/18/2024 NEGATIVE  NEGATIVE Final    Ketones, Urine 01/18/2024 NEGATIVE  NEGATIVE mg/dL Final    Bilirubin, Urine 01/18/2024 NEGATIVE  NEGATIVE Final    Urobilinogen, Urine 01/18/2024 <2.0  <2.0 mg/dL Final    Nitrite, Urine 01/18/2024 NEGATIVE  NEGATIVE Final    Leukocyte Esterase, Urine 01/18/2024 NEGATIVE  NEGATIVE Final    Extra Tube 01/18/2024 Hold for add-ons.   Final    Auto resulted.   Admission on 12/29/2023, Discharged on 12/29/2023   Component Date Value Ref Range Status    WBC 12/29/2023 6.2  4.4 - 11.3 x10*3/uL Final    nRBC 12/29/2023 0.0  0.0 - 0.0 /100 WBCs Final    RBC 12/29/2023 4.78  4.00 - 5.20 x10*6/uL Final    Hemoglobin 12/29/2023 13.2  12.0 - 16.0 g/dL Final    Hematocrit 12/29/2023 41.8  36.0 - 46.0 % Final    MCV 12/29/2023 87  80 - 100 fL Final    MCH 12/29/2023 27.6  26.0 - 34.0 pg Final    MCHC 12/29/2023 31.6 (L)  32.0 - 36.0 g/dL Final    RDW 12/29/2023 13.7  11.5 - 14.5 % Final    Platelets 12/29/2023 250  150 - 450 x10*3/uL Final    Neutrophils % 12/29/2023 56.5  40.0 - 80.0 % Final    Immature Granulocytes %, Automated 12/29/2023 0.5  0.0 - 0.9 % Final    Immature Granulocyte Count (IG) includes promyelocytes, myelocytes and metamyelocytes but does not include bands. Percent differential counts (%) should be interpreted in the context of the absolute cell counts (cells/UL).     Lymphocytes % 12/29/2023 31.8  13.0 - 44.0 % Final    Monocytes % 12/29/2023 8.5  2.0 - 10.0 % Final    Eosinophils % 12/29/2023 1.9  0.0 - 6.0 % Final    Basophils % 12/29/2023 0.8  0.0 - 2.0 % Final    Neutrophils Absolute 12/29/2023 3.51  1.60 - 5.50 x10*3/uL Final    Percent differential counts (%) should be interpreted in the context of the absolute cell counts (cells/uL).    Immature Granulocytes Absolute, Au* 12/29/2023 0.03  0.00 - 0.50 x10*3/uL Final    Lymphocytes Absolute 12/29/2023 1.98  0.80 - 3.00 x10*3/uL Final    Monocytes Absolute 12/29/2023 0.53  0.05 - 0.80 x10*3/uL Final    Eosinophils Absolute 12/29/2023 0.12  0.00 - 0.40 x10*3/uL Final    Basophils Absolute 12/29/2023 0.05  0.00 - 0.10 x10*3/uL Final    Phosphorus 12/29/2023 4.3  2.5 - 4.9 mg/dL Final    MARKED HEMOLYSIS DETECTED. The result may be falsely elevated due to hemolysis or other interferents. Clinical correlation is recommended. Repeat testing may be considered.  The performance characteristics of phosphorus testing in heparinized plasma have been validated by the individual  laboratory site where testing is performed. Testing on heparinized plasma is not approved by the FDA; however, such approval is not necessary.    Magnesium 12/29/2023 1.95  1.60 - 2.40 mg/dL Final    MARKED HEMOLYSIS DETECTED. The result may be falsely elevated due to hemolysis or other interferents. Clinical correlation is recommended. Repeat testing may be considered.    Glucose 12/29/2023 106 (H)  74 - 99 mg/dL Final    Sodium 12/29/2023 137  136 - 145 mmol/L Final    Potassium 12/29/2023 5.3  3.5 - 5.3 mmol/L Final    MARKED HEMOLYSIS DETECTED. The result may be falsely elevated due to hemolysis or other interferents. Clinical correlation is recommended. Repeat testing may be considered.    Chloride 12/29/2023 106  98 - 107 mmol/L Final    Bicarbonate 12/29/2023 24  21 - 32 mmol/L Final    Anion Gap 12/29/2023 12  10 - 20 mmol/L Final    Urea Nitrogen  12/29/2023 16  6 - 23 mg/dL Final    Creatinine 12/29/2023 0.76  0.50 - 1.05 mg/dL Final    eGFR 12/29/2023 81  >60 mL/min/1.73m*2 Final    Calculations of estimated GFR are performed using the 2021 CKD-EPI Study Refit equation without the race variable for the IDMS-Traceable creatinine methods.  https://jasn.asnjournals.org/content/early/2021/09/22/ASN.6652144865    Calcium 12/29/2023 9.5  8.6 - 10.3 mg/dL Final    Albumin 12/29/2023 4.1  3.4 - 5.0 g/dL Final    MARKED HEMOLYSIS DETECTED. The result may be falsely elevated due to hemolysis or other interferents. Clinical correlation is recommended. Repeat testing may be considered.    Alkaline Phosphatase 12/29/2023 78  33 - 136 U/L Final    MARKED HEMOLYSIS DETECTED. The result may be falsely decreased due to hemolysis or other interferents. Clinical correlation is recommended. Repeat testing may be considered.    Total Protein 12/29/2023 6.6  6.4 - 8.2 g/dL Final    AST 12/29/2023 26  9 - 39 U/L Final    MARKED HEMOLYSIS DETECTED. The result may be falsely elevated due to hemolysis or other interferents. Clinical correlation is recommended. Repeat testing may be considered.    Bilirubin, Total 12/29/2023 0.4  0.0 - 1.2 mg/dL Final    ALT 12/29/2023 16  7 - 45 U/L Final    Patients treated with Sulfasalazine may generate falsely decreased results for ALT.    Protime 12/29/2023 10.0  9.8 - 12.8 seconds Final    INR 12/29/2023 0.9  0.9 - 1.1 Final    aPTT 12/29/2023 32  27 - 38 seconds Final    Troponin I, High Sensitivity 12/29/2023 7  0 - 13 ng/L Final    BNP 12/29/2023 74  0 - 99 pg/mL Final    Ventricular Rate 12/29/2023 62  BPM Final    Atrial Rate 12/29/2023 63  BPM Final    [place] Interval 12/29/2023 124  ms Final    QRS Duration 12/29/2023 101  ms Final    QT Interval 12/29/2023 412  ms Final    QTC Calculation(Bazett) 12/29/2023 419  ms Final    P Axis 12/29/2023 -11  degrees Final    R Axis 12/29/2023 -5  degrees Final    T Axis 12/29/2023 2  degrees Final     QRS Count 12/29/2023 10  beats Final    Q Onset 12/29/2023 249  ms Final    T Offset 12/29/2023 455  ms Final    QTC Fredericia 12/29/2023 416  ms Final         Right knee exam: skin intact no lacerations or abrations.  1+ effusion.  Tender medial joint line. negative log roll negative patellar grind. ROM 5-100, varus deformity. stable to varus and valgus stress at 0 and 30 degrees. negative lachman negative posterior drawer negative cosme. 5/5 ehl/fhl/gs/ta. silt s/s/sp/dp/t. 2+ dp/pt        Preoperative labs reviewed, no findings which would preclude surgery    Impression plan: This is a 77 y.o. yo femalewith severe end-stage degenerative disease of the right knee that has failed nonoperative management.  Once again I discussed with the patient in detail the risks benefits and alternatives of total joint replacement. For the full details of that discussion see my previous note. The patient has obtained appropriate medical and dental clearance, and her labs have been reviewed. We will plan to proceed with surgery.    BMI Readings from Last 1 Encounters:   01/18/24 39.18 kg/m²     Lab Results   Component Value Date    CREATININE 0.92 01/18/2024     Tobacco Use: Low Risk  (1/18/2024)    Patient History     Smoking Tobacco Use: Never     Smokeless Tobacco Use: Never     Passive Exposure: Never      MELD 3.0: 7 at 12/29/2023  1:58 PM  MELD-Na: 6 at 12/29/2023  1:58 PM  Calculated from:  Serum Creatinine: 0.76 mg/dL (Using min of 1 mg/dL) at 12/29/2023  1:58 PM  Serum Sodium: 137 mmol/L at 12/29/2023  1:58 PM  Total Bilirubin: 0.4 mg/dL (Using min of 1 mg/dL) at 12/29/2023  1:58 PM  Serum Albumin: 4.1 g/dL (Using max of 3.5 g/dL) at 12/29/2023  1:58 PM  INR(ratio): 0.9 (Using min of 1) at 12/29/2023  1:58 PM  Age at listing (hypothetical): 77 years  Sex: Female at 12/29/2023  1:58 PM       Lab Results   Component Value Date    HGBA1C 6.1 (H) 11/09/2023     Lab Results   Component Value Date    STAPHMRSASCR No  Female Staphylococcus aureus isolated 01/18/2024

## 2024-03-25 ENCOUNTER — EMERGENCY (EMERGENCY)
Facility: HOSPITAL | Age: 39
LOS: 1 days | Discharge: HOME | End: 2024-03-25
Attending: EMERGENCY MEDICINE
Payer: COMMERCIAL

## 2024-03-25 VITALS
SYSTOLIC BLOOD PRESSURE: 116 MMHG | HEART RATE: 71 BPM | DIASTOLIC BLOOD PRESSURE: 70 MMHG | OXYGEN SATURATION: 99 % | RESPIRATION RATE: 81 BRPM

## 2024-03-25 VITALS
OXYGEN SATURATION: 100 % | SYSTOLIC BLOOD PRESSURE: 116 MMHG | HEART RATE: 85 BPM | DIASTOLIC BLOOD PRESSURE: 76 MMHG | RESPIRATION RATE: 20 BRPM | TEMPERATURE: 98 F | HEIGHT: 64 IN | WEIGHT: 235.01 LBS

## 2024-03-25 DIAGNOSIS — Z98.891 HISTORY OF UTERINE SCAR FROM PREVIOUS SURGERY: Chronic | ICD-10-CM

## 2024-03-25 DIAGNOSIS — K08.409 PARTIAL LOSS OF TEETH, UNSPECIFIED CAUSE, UNSPECIFIED CLASS: Chronic | ICD-10-CM

## 2024-03-25 DIAGNOSIS — Z87.2 PERSONAL HISTORY OF DISEASES OF THE SKIN AND SUBCUTANEOUS TISSUE: Chronic | ICD-10-CM

## 2024-03-25 DIAGNOSIS — Z98.890 OTHER SPECIFIED POSTPROCEDURAL STATES: Chronic | ICD-10-CM

## 2024-03-25 LAB
ALBUMIN SERPL ELPH-MCNC: 4.1 G/DL — SIGNIFICANT CHANGE UP (ref 3.3–5)
ALP SERPL-CCNC: 68 U/L — SIGNIFICANT CHANGE UP (ref 40–120)
ALT FLD-CCNC: 24 U/L — SIGNIFICANT CHANGE UP (ref 10–45)
ANION GAP SERPL CALC-SCNC: 15 MMOL/L — SIGNIFICANT CHANGE UP (ref 5–17)
APPEARANCE UR: CLEAR — SIGNIFICANT CHANGE UP
AST SERPL-CCNC: 22 U/L — SIGNIFICANT CHANGE UP (ref 10–40)
BACTERIA # UR AUTO: NEGATIVE /HPF — SIGNIFICANT CHANGE UP
BASOPHILS # BLD AUTO: 0.02 K/UL — SIGNIFICANT CHANGE UP (ref 0–0.2)
BASOPHILS NFR BLD AUTO: 0.3 % — SIGNIFICANT CHANGE UP (ref 0–2)
BILIRUB SERPL-MCNC: 0.3 MG/DL — SIGNIFICANT CHANGE UP (ref 0.2–1.2)
BILIRUB UR-MCNC: NEGATIVE — SIGNIFICANT CHANGE UP
BUN SERPL-MCNC: 14 MG/DL — SIGNIFICANT CHANGE UP (ref 7–23)
CALCIUM SERPL-MCNC: 9.6 MG/DL — SIGNIFICANT CHANGE UP (ref 8.4–10.5)
CAST: 0 /LPF — SIGNIFICANT CHANGE UP (ref 0–4)
CHLORIDE SERPL-SCNC: 102 MMOL/L — SIGNIFICANT CHANGE UP (ref 96–108)
CO2 SERPL-SCNC: 21 MMOL/L — LOW (ref 22–31)
COLOR SPEC: YELLOW — SIGNIFICANT CHANGE UP
CREAT SERPL-MCNC: 0.74 MG/DL — SIGNIFICANT CHANGE UP (ref 0.5–1.3)
DIFF PNL FLD: NEGATIVE — SIGNIFICANT CHANGE UP
EGFR: 107 ML/MIN/1.73M2 — SIGNIFICANT CHANGE UP
EOSINOPHIL # BLD AUTO: 0.03 K/UL — SIGNIFICANT CHANGE UP (ref 0–0.5)
EOSINOPHIL NFR BLD AUTO: 0.5 % — SIGNIFICANT CHANGE UP (ref 0–6)
GLUCOSE SERPL-MCNC: 91 MG/DL — SIGNIFICANT CHANGE UP (ref 70–99)
GLUCOSE UR QL: NEGATIVE MG/DL — SIGNIFICANT CHANGE UP
HCG SERPL-ACNC: <2 MIU/ML — SIGNIFICANT CHANGE UP
HCT VFR BLD CALC: 40.1 % — SIGNIFICANT CHANGE UP (ref 34.5–45)
HGB BLD-MCNC: 13 G/DL — SIGNIFICANT CHANGE UP (ref 11.5–15.5)
IMM GRANULOCYTES NFR BLD AUTO: 0.6 % — SIGNIFICANT CHANGE UP (ref 0–0.9)
KETONES UR-MCNC: NEGATIVE MG/DL — SIGNIFICANT CHANGE UP
LEUKOCYTE ESTERASE UR-ACNC: NEGATIVE — SIGNIFICANT CHANGE UP
LYMPHOCYTES # BLD AUTO: 1.43 K/UL — SIGNIFICANT CHANGE UP (ref 1–3.3)
LYMPHOCYTES # BLD AUTO: 21.9 % — SIGNIFICANT CHANGE UP (ref 13–44)
MAGNESIUM SERPL-MCNC: 2 MG/DL — SIGNIFICANT CHANGE UP (ref 1.6–2.6)
MCHC RBC-ENTMCNC: 26.9 PG — LOW (ref 27–34)
MCHC RBC-ENTMCNC: 32.4 GM/DL — SIGNIFICANT CHANGE UP (ref 32–36)
MCV RBC AUTO: 83 FL — SIGNIFICANT CHANGE UP (ref 80–100)
MONOCYTES # BLD AUTO: 0.25 K/UL — SIGNIFICANT CHANGE UP (ref 0–0.9)
MONOCYTES NFR BLD AUTO: 3.8 % — SIGNIFICANT CHANGE UP (ref 2–14)
NEUTROPHILS # BLD AUTO: 4.75 K/UL — SIGNIFICANT CHANGE UP (ref 1.8–7.4)
NEUTROPHILS NFR BLD AUTO: 72.9 % — SIGNIFICANT CHANGE UP (ref 43–77)
NITRITE UR-MCNC: NEGATIVE — SIGNIFICANT CHANGE UP
NRBC # BLD: 0 /100 WBCS — SIGNIFICANT CHANGE UP (ref 0–0)
PH UR: 6 — SIGNIFICANT CHANGE UP (ref 5–8)
PHOSPHATE SERPL-MCNC: 2.8 MG/DL — SIGNIFICANT CHANGE UP (ref 2.5–4.5)
PLATELET # BLD AUTO: 361 K/UL — SIGNIFICANT CHANGE UP (ref 150–400)
POTASSIUM SERPL-MCNC: 4 MMOL/L — SIGNIFICANT CHANGE UP (ref 3.5–5.3)
POTASSIUM SERPL-SCNC: 4 MMOL/L — SIGNIFICANT CHANGE UP (ref 3.5–5.3)
PROT SERPL-MCNC: 8.1 G/DL — SIGNIFICANT CHANGE UP (ref 6–8.3)
PROT UR-MCNC: NEGATIVE MG/DL — SIGNIFICANT CHANGE UP
RBC # BLD: 4.83 M/UL — SIGNIFICANT CHANGE UP (ref 3.8–5.2)
RBC # FLD: 13.5 % — SIGNIFICANT CHANGE UP (ref 10.3–14.5)
RBC CASTS # UR COMP ASSIST: 0 /HPF — SIGNIFICANT CHANGE UP (ref 0–4)
SODIUM SERPL-SCNC: 138 MMOL/L — SIGNIFICANT CHANGE UP (ref 135–145)
SP GR SPEC: 1.01 — SIGNIFICANT CHANGE UP (ref 1–1.03)
SQUAMOUS # UR AUTO: 3 /HPF — SIGNIFICANT CHANGE UP (ref 0–5)
UROBILINOGEN FLD QL: 0.2 MG/DL — SIGNIFICANT CHANGE UP (ref 0.2–1)
WBC # BLD: 6.52 K/UL — SIGNIFICANT CHANGE UP (ref 3.8–10.5)
WBC # FLD AUTO: 6.52 K/UL — SIGNIFICANT CHANGE UP (ref 3.8–10.5)
WBC UR QL: 1 /HPF — SIGNIFICANT CHANGE UP (ref 0–5)

## 2024-03-25 PROCEDURE — 86140 C-REACTIVE PROTEIN: CPT

## 2024-03-25 PROCEDURE — 76705 ECHO EXAM OF ABDOMEN: CPT

## 2024-03-25 PROCEDURE — 76705 ECHO EXAM OF ABDOMEN: CPT | Mod: 26

## 2024-03-25 PROCEDURE — 99285 EMERGENCY DEPT VISIT HI MDM: CPT

## 2024-03-25 PROCEDURE — 96374 THER/PROPH/DIAG INJ IV PUSH: CPT

## 2024-03-25 PROCEDURE — 85025 COMPLETE CBC W/AUTO DIFF WBC: CPT

## 2024-03-25 PROCEDURE — 81001 URINALYSIS AUTO W/SCOPE: CPT

## 2024-03-25 PROCEDURE — 84100 ASSAY OF PHOSPHORUS: CPT

## 2024-03-25 PROCEDURE — 83735 ASSAY OF MAGNESIUM: CPT

## 2024-03-25 PROCEDURE — 80053 COMPREHEN METABOLIC PANEL: CPT

## 2024-03-25 PROCEDURE — 85652 RBC SED RATE AUTOMATED: CPT

## 2024-03-25 PROCEDURE — 86618 LYME DISEASE ANTIBODY: CPT

## 2024-03-25 PROCEDURE — 99284 EMERGENCY DEPT VISIT MOD MDM: CPT | Mod: 25

## 2024-03-25 PROCEDURE — 84702 CHORIONIC GONADOTROPIN TEST: CPT

## 2024-03-25 RX ORDER — FAMOTIDINE 10 MG/ML
20 INJECTION INTRAVENOUS ONCE
Refills: 0 | Status: COMPLETED | OUTPATIENT
Start: 2024-03-25 | End: 2024-03-25

## 2024-03-25 RX ADMIN — FAMOTIDINE 20 MILLIGRAM(S): 10 INJECTION INTRAVENOUS at 13:01

## 2024-03-25 RX ADMIN — Medication 30 MILLILITER(S): at 13:01

## 2024-03-25 NOTE — ED PROVIDER NOTE - OBJECTIVE STATEMENT
38-year-old female history of hypothyroidism and albuterol additional diabetes, for Lyme disease presents with multiple complaints.  Patient endorses 1 week of generalized swelling in all 4 extremities and joint pain worsening wrist.  Also reports rash to the face and sometimes on the arms.  Also reports upper abdominal discomfort and nausea but no vomiting for several days.  This is associated with a dietary change he is trying to "eat healthier."  Reports history of Lyme disease over 10 years ago but was never treated, she started treatment last year with p.o. courses but never had IV antibiotics.  Came to ED today due to severity of pain in extremities due to swelling.  Denies cardiac history and family under age 50.  Denies associated CP, SOB, palpitations, dizziness, LOC, vomiting, hematemesis, hematochezia, hematuria, urinary frequency, urinary urgency.  LMP 1 week ago reported normal.

## 2024-03-25 NOTE — ED PROVIDER NOTE - NSICDXPASTSURGICALHX_GEN_ALL_CORE_FT
PAST SURGICAL HISTORY:   delivery delivered 2019. delivered at 38 weeks gestation, baby boy weighing 7 pounds, 12 ounces    H/O pilonidal cyst     H/O:      S/P eye surgery     [place] teeth removed

## 2024-03-25 NOTE — ED PROVIDER NOTE - PROGRESS NOTE DETAILS
CRP mildly elevated, remaining lab work nonactionable.  POCUS gallbladder nonactionable.  Will give expedited rheumatology follow-up.  Spoke with referral coordinator who will expedite. Will dc with follow up. Discussed plan and return precautions with patient who understands and agrees. All questions answered. - Jacobo Melissa PA-C

## 2024-03-25 NOTE — ED PROVIDER NOTE - MUSCULOSKELETAL, MLM
Generalized mild non-pitting edema to all 4 extremities. Spine appears normal, range of motion is not limited, no muscle or joint tenderness Generalized mild non-pitting edema to all 4 extremities. Calves soft nontender equal in size neg homans bilaterally. Spine appears normal, range of motion is not limited, no muscle or joint tenderness

## 2024-03-25 NOTE — ED PROVIDER NOTE - NSFOLLOWUPINSTRUCTIONS_ED_ALL_ED_FT
Please follow up with your primary care doctor in 1-3 days.  Follow up with rheumatology - you will be contacted by our referral coordinator.    *Bring all printed lab/test results to your appointment(s).*    Take ibuprofen 400-600mg every 6 hrs as needed for pain. Take with food and Pepcid 20mg.  Take acetaminophen 500-1000mg every 6 hrs as needed for pain. DO NOT EXCEED 4000mg DAILY.    Return to the ED for worsening pain, dizziness, chest pain, shortness of breath, fevers, or any other concerns.

## 2024-03-25 NOTE — ED PROVIDER NOTE - PATIENT PORTAL LINK FT
You can access the FollowMyHealth Patient Portal offered by [facility] by registering at the following website: [email or web address]. By joining [facility]’s FollowMyHealth portal, you will also be able to view your health information using other applications (apps) compatible with our system.

## 2024-03-25 NOTE — ED ADULT TRIAGE NOTE - CHIEF COMPLAINT QUOTE
nausea, Generalized swelling and joint pain x 1 week, multiple other medical complaints   Hx of lymes disease

## 2024-03-25 NOTE — ED ADULT NURSE NOTE - NSFALLUNIVINTERV_ED_ALL_ED
Bed/Stretcher in lowest position, wheels locked, appropriate side rails in place/Call bell, personal items and telephone in reach/Instruct patient to call for assistance before getting out of bed/chair/stretcher/Non-slip footwear applied when patient is off stretcher/[place] to call system/Physically safe environment - no spills, clutter or unnecessary equipment/Purposeful proactive rounding/Room/bathroom lighting operational, light cord in reach

## 2024-03-25 NOTE — ED ADULT NURSE NOTE - NSICDXPASTMEDICALHX_GEN_ALL_CORE_FT
PAST MEDICAL HISTORY:  Gestational diabetes on insulin while pregnant for repeat  on 22    Hypoglycemia diagnosed as a teenager    Hypothyroidism dx @ 25 y.o. F/U with endocrinologist DR Renate Vanessa    Lyme disease     Pregnant to have repeat  on 22    SOB (shortness of breath) COVID POSITIVE in

## 2024-03-25 NOTE — ED PROVIDER NOTE - GASTROINTESTINAL, MLM
Abdomen soft, non-tender, no guarding. BEG murphys Abdomen soft, non-tender, no guarding. NEG murphys

## 2024-03-25 NOTE — ED ADULT NURSE NOTE - NSICDXPASTSURGICALHX_GEN_ALL_CORE_FT
PAST SURGICAL HISTORY:   delivery delivered 2019. delivered at 38 weeks gestation, baby boy weighing 7 pounds, 12 ounces    H/O pilonidal cyst     H/O:      S/P eye surgery     [place] teeth removed

## 2024-03-25 NOTE — ED PROVIDER NOTE - CLINICAL SUMMARY MEDICAL DECISION MAKING FREE TEXT BOX
Dr. Curry (Attending Physician)  38-year-old female history of hypothyroidism and albuterol additional diabetes, for Lyme disease presents with multiple complaints.  Patient endorses 1 week of generalized swelling in all 4 extremities and joint pain worsening wrist.  Also reports rash to the face and sometimes on the arms.  Also reports upper abdominal discomfort and nausea but no vomiting for several days.  This is associated with a dietary change he is trying to "eat healthier."  Reports history of Lyme disease over 10 years ago but was never treated, she started treatment last year with p.o. courses but never had IV antibiotics.  Came to ED today due to severity of pain in extremities due to swelling.  Denies cardiac history and family under age 50.  Denies associated CP, SOB, palpitations, dizziness, LOC, vomiting, hematemesis, hematochezia, hematuria, urinary frequency, urinary urgency.  LMP 1 week ago reported normal.    Diffuse arthralgias, non-pitting edema, malar rash, concern for lupus or RA. Will send esr CRP. DC with rheum follow-up.  Epigastric pain but nontender to palpation. Will get right upper quadrant ultrasound. And give GI cocktail. Has been taking ibuprofen for joint pains may be dyspepsia.

## 2024-03-25 NOTE — ED ADULT NURSE NOTE - OBJECTIVE STATEMENT
38 year old female, A&OX4, pmh hypothyroidism, gestational diabetes, lyme disease, presenting to ED complaining of generalized swelling. Pt states she has noticed increased swelling in all extremities, joint pain x1 week. Patient also endorsing rash to face and sometimes on arms as well as abdominal pain, nausea without vomiting. States she has been eating healthier recently and changed her diet. Patient was diagnosed with lyme disease approximately 10 years ago but never got treated initially. Patient had one course of PO antibiotics last years. Denies CP, SOB, HA, vomiting, diarrhea,, difficulty urinating, fevers and chills. Heart  rate regular. Respirations clear and equal bilaterally. Abdomen soft, nontender and nondistended. Peripheral pulses strong and equal bilaterally. Edema noted to upper and lower extremities. Moving all extremities, ROM limited d/t pain. IV placed and labs drawn. Safety and comfort measures maintained.

## 2024-03-26 LAB
B BURGDOR C6 AB SER-ACNC: NEGATIVE — SIGNIFICANT CHANGE UP
B BURGDOR IGG+IGM SER-ACNC: 0.26 INDEX — SIGNIFICANT CHANGE UP (ref 0.01–0.89)
ERYTHROCYTE [SEDIMENTATION RATE] IN BLOOD: 53 MM/HR — HIGH (ref 0–15)

## 2024-03-29 ENCOUNTER — APPOINTMENT (OUTPATIENT)
Dept: RHEUMATOLOGY | Facility: CLINIC | Age: 39
End: 2024-03-29
Payer: COMMERCIAL

## 2024-03-29 VITALS
HEART RATE: 78 BPM | DIASTOLIC BLOOD PRESSURE: 76 MMHG | BODY MASS INDEX: 39.27 KG/M2 | TEMPERATURE: 97.6 F | OXYGEN SATURATION: 98 % | WEIGHT: 230 LBS | HEIGHT: 64 IN | SYSTOLIC BLOOD PRESSURE: 118 MMHG

## 2024-03-29 DIAGNOSIS — A69.20 LYME DISEASE, UNSPECIFIED: ICD-10-CM

## 2024-03-29 DIAGNOSIS — M25.50 PAIN IN UNSPECIFIED JOINT: ICD-10-CM

## 2024-03-29 DIAGNOSIS — M25.40 EFFUSION, UNSPECIFIED JOINT: ICD-10-CM

## 2024-03-29 PROCEDURE — 99204 OFFICE O/P NEW MOD 45 MIN: CPT

## 2024-03-29 RX ORDER — SERTRALINE 25 MG/1
TABLET, FILM COATED ORAL
Refills: 0 | Status: ACTIVE | COMMUNITY

## 2024-03-29 RX ORDER — LEVOTHYROXINE SODIUM 0.14 MG/1
137 TABLET ORAL
Refills: 0 | Status: ACTIVE | COMMUNITY

## 2024-04-02 PROBLEM — M25.50 ARTHRALGIA OF MULTIPLE JOINTS: Status: ACTIVE | Noted: 2024-04-02

## 2024-04-02 PROBLEM — M25.40 JOINT SWELLING: Status: ACTIVE | Noted: 2024-03-29

## 2024-04-02 PROBLEM — A69.20 LYME DISEASE: Status: ACTIVE | Noted: 2024-04-02

## 2024-04-02 NOTE — PHYSICAL EXAM
[General Appearance - Alert] : alert [General Appearance - In No Acute Distress] : in no acute distress [General Appearance - Well Developed] : well developed [General Appearance - Well-Appearing] : healthy appearing [Sclera] : the sclera and conjunctiva were normal [Extraocular Movements] : extraocular movements were intact [Exaggerated Use Of Accessory Muscles For Inspiration] : no accessory muscle use [Edema] : there was no peripheral edema [Musculoskeletal - Swelling] : no joint swelling seen [FreeTextEntry1] : no joint tenderness  [] : no rash [Skin Lesions] : no skin lesions [No Focal Deficits] : no focal deficits [Oriented To Time, Place, And Person] : oriented to person, place, and time [Affect] : the affect was normal [Mood] : the mood was normal

## 2024-04-02 NOTE — HISTORY OF PRESENT ILLNESS
[FreeTextEntry1] : 38F with hypothyroidism, hx of gestiational diabetes (has 2 children), reported hx of Lyme disease, here after a recent ER visit a few days ago for joint/limb swelling and brain fog.   for the past 2 weeks has had brain fog and fatigue which prompted her to initially go to Urgent Care.  diagnosed with Lyme disease >10 years ago (never noted a tick bite). not treated until . later also diagnosed with +Bartonella. Was treated with doxycycline but was having mental health issues (diagnosed with anxiety and ADHD) so did not take the doxycycline consistently.   2 weeks ago was having pains down her legs, feelign sensation of overheating. was geting rash on her face in malar distribution.   since birth of her child in , was having pain and swelling in entire LLE.  joint pain and major swelling which persisted for at least a week, which started over a week ago- started acutely in b/l hands, with pain especially in R 2nd PIP and DIP joints. Swelling in b/l lower extremities. Was taking a lot of Advil (also ) at the time which prompted abdominal pain (was taking it on empty stomach). Appetite has gone down.   No unintentional weight loss. did notice a rash on b/l forearms at the time of joint pain and swelling. has noticed malar rash more frequently now. not resolving.  for the past few days, not eating well. Started to eat "clean' last few days.  no oral or nasal ulcers. +clumps of hair falling out. No hematuria. +Dry eyes, has had Lasik surgery. +Dry mouth. No parotid swelling. Lymph nodes feel inflamed in the neck but not sure.  no hx of serositis. No preeclampsia or miscarriage history. No hx of DVT/PE and was checked for that after her childbirths.  had COVID infection in 3/2020 and again in 2023.   Works as .  [Fatigue] : fatigue [Skin Lesions] : skin lesions [Dry Mouth] : dry mouth [Arthralgias] : arthralgias [Joint Swelling] : joint swelling [Dry Eyes] : dry eyes

## 2024-04-02 NOTE — ASSESSMENT
[FreeTextEntry1] : 38F with hypothyroidism, hx of gestiational diabetes (has 2 children), reported hx of Lyme disease, here after a recent ER visit a few days ago for joint/limb swelling and brain fog.  Reports not getting treated for Lyme until many years later, and even then she did not take the doxycycline consistently.  No notable findings on exam today, no synovitis.   I have ordered inflammatory markers (ESR), as well as tests for SLE, Sjogrens, RA, APLS, and TSH and Vitamin D levels to evaluate if her symptoms are due to an autoimmune etiology.   Further plan to follow pending results of the above tests.  She also plans on seeing a Lyme disease specialist soon.

## 2024-04-02 NOTE — REVIEW OF SYSTEMS
[Feeling Tired] : feeling tired [Dry Eyes] : dryness of the eyes [Joint Pain] : joint pain [Joint Swelling] : joint swelling [As Noted in HPI] : as noted in HPI [Negative] : Heme/Lymph

## 2024-04-03 RX ORDER — HUMAN INSULIN 100 [IU]/ML
0 INJECTION, SUSPENSION SUBCUTANEOUS
Qty: 0 | Refills: 0 | DISCHARGE

## 2024-04-03 RX ORDER — LEVOTHYROXINE SODIUM 125 MCG
1 TABLET ORAL
Qty: 0 | Refills: 0 | DISCHARGE

## 2024-04-10 ENCOUNTER — LABORATORY RESULT (OUTPATIENT)
Age: 39
End: 2024-04-10

## 2024-04-15 LAB
25(OH)D3 SERPL-MCNC: 17 NG/ML
ALBUMIN SERPL ELPH-MCNC: 4.4 G/DL
ALP BLD-CCNC: 64 U/L
ALT SERPL-CCNC: 19 U/L
ANA PAT FLD IF-IMP: ABNORMAL
ANA SER IF-ACNC: ABNORMAL
ANION GAP SERPL CALC-SCNC: 11 MMOL/L
APPEARANCE: ABNORMAL
AST SERPL-CCNC: 20 U/L
B19V IGG SER QL IA: 5 INDEX
B19V IGG+IGM SER-IMP: NORMAL
B19V IGG+IGM SER-IMP: POSITIVE
B19V IGM FLD-ACNC: 7.78 INDEX
B19V IGM SER-ACNC: POSITIVE
B2 GLYCOPROT1 AB SER QL: NEGATIVE
BILIRUB SERPL-MCNC: 0.5 MG/DL
BILIRUBIN URINE: NEGATIVE
BLOOD URINE: NEGATIVE
BUN SERPL-MCNC: 13 MG/DL
C3 SERPL-MCNC: 129 MG/DL
C4 SERPL-MCNC: 20 MG/DL
CALCIUM SERPL-MCNC: 9 MG/DL
CARDIOLIPIN AB SER IA-ACNC: POSITIVE
CCP AB SER IA-ACNC: <8 UNITS
CHLORIDE SERPL-SCNC: 101 MMOL/L
CO2 SERPL-SCNC: 25 MMOL/L
COLOR: YELLOW
CREAT SERPL-MCNC: 0.7 MG/DL
CREAT SPEC-SCNC: 138 MG/DL
CREAT/PROT UR: 0.1 RATIO
CRP SERPL-MCNC: 6 MG/L
DSDNA AB SER-ACNC: 1 IU/ML
EGFR: 114 ML/MIN/1.73M2
ENA RNP AB SER IA-ACNC: 0.3 AL
ENA SM AB SER IA-ACNC: <0.2 AL
ENA SS-A AB SER IA-ACNC: <0.2 AL
ENA SS-B AB SER IA-ACNC: 0.9 AL
ERYTHROCYTE [SEDIMENTATION RATE] IN BLOOD BY WESTERGREN METHOD: 42 MM/HR
G6PD SER-CCNC: 17.3 U/G HGB
GLUCOSE QUALITATIVE U: NEGATIVE MG/DL
GLUCOSE SERPL-MCNC: 99 MG/DL
KETONES URINE: NEGATIVE MG/DL
LEUKOCYTE ESTERASE URINE: NEGATIVE
NITRITE URINE: NEGATIVE
PH URINE: 7.5
POTASSIUM SERPL-SCNC: 4.3 MMOL/L
PROT SERPL-MCNC: 7.2 G/DL
PROT UR-MCNC: 10 MG/DL
PROTEIN URINE: NEGATIVE MG/DL
RF+CCP IGG SER-IMP: NEGATIVE
RHEUMATOID FACT SER QL: <10 IU/ML
SODIUM SERPL-SCNC: 137 MMOL/L
SPECIFIC GRAVITY URINE: 1.02
TSH SERPL-ACNC: 0.08 UIU/ML
UROBILINOGEN URINE: 0.2 MG/DL

## 2024-04-19 PROBLEM — E16.2 HYPOGLYCEMIA, UNSPECIFIED: Chronic | Status: ACTIVE | Noted: 2019-11-05

## 2024-04-19 PROBLEM — E03.9 HYPOTHYROIDISM, UNSPECIFIED: Chronic | Status: ACTIVE | Noted: 2019-11-05

## 2024-04-19 PROBLEM — O24.419 GESTATIONAL DIABETES MELLITUS IN PREGNANCY, UNSPECIFIED CONTROL: Chronic | Status: ACTIVE | Noted: 2022-03-29

## 2024-04-19 PROBLEM — A69.20 LYME DISEASE, UNSPECIFIED: Chronic | Status: ACTIVE | Noted: 2024-01-17

## 2024-04-19 PROBLEM — Z34.90 ENCOUNTER FOR SUPERVISION OF NORMAL PREGNANCY, UNSPECIFIED, UNSPECIFIED TRIMESTER: Chronic | Status: ACTIVE | Noted: 2022-03-29

## 2024-04-19 PROBLEM — R06.02 SHORTNESS OF BREATH: Chronic | Status: ACTIVE | Noted: 2022-03-29

## 2024-07-03 ENCOUNTER — APPOINTMENT (OUTPATIENT)
Dept: RHEUMATOLOGY | Facility: CLINIC | Age: 39
End: 2024-07-03

## 2025-02-05 NOTE — DISCHARGE NOTE OB - NS OB DC TDAP REASON NOT RECEIVED
- Nasal swab for COVID, RSV, influenza A, influenza B is negative.  -Continue nebulizer treatments as needed and scheduled Flovent.  -Oral steroids as prescribed.  -Continue to follow asthma action plan.   Contraindicated

## 2025-02-18 NOTE — PACU DISCHARGE NOTE - NSPTMEETSDISCHCRITERIADT_GEN_A_CORE
HPI    CSCR is back patient states he has seen several doctor for this.  Patient is here to check health of eyes.  New patient last eye exam 04/16/2024 Filiberto Plata MD  Last dilation 04/16/2024.  Retinal neovascularization, unspecified, left eye  Benign neoplasm of right choroid  Central serous chorioretinopathy, bilateral    Hx of intraocular injections c Dr Holland   Last injx 2020  Ocuvite bid po    Last edited by Monalisa Barrientos MA on 2/18/2025  1:00 PM.            Assessment /Plan     For exam results, see Encounter Report.    Nuclear sclerosis of both eyes  Cataracts are present but not visually significant. Will continue to monitor. Mrx provided.    Central serous chorioretinopathy of left eye  Will refer to Dr. Kimbrough for further evaluation and treatment.    RTC 1 year, sooner prn                   
06-Nov-2019 14:27

## 2025-06-18 NOTE — OB PST NOTE - DOES PATIENT HAVE ADVANCE DIRECTIVE
The author called the pt's wife 5 times with the  (ID # [id number]). No answer, line just rang. Unable to lvm.   Size Of Lesion In Cm: 1 Given HCP to complete at home

## (undated) DEVICE — DRSG DERMABOND PRINEO 22CM